# Patient Record
Sex: FEMALE | Race: WHITE
[De-identification: names, ages, dates, MRNs, and addresses within clinical notes are randomized per-mention and may not be internally consistent; named-entity substitution may affect disease eponyms.]

---

## 2018-03-30 ENCOUNTER — HOSPITAL ENCOUNTER (OUTPATIENT)
Dept: HOSPITAL 47 - RADCTMAIN | Age: 38
Discharge: HOME | End: 2018-03-30
Payer: COMMERCIAL

## 2018-03-30 DIAGNOSIS — N20.0: Primary | ICD-10-CM

## 2018-03-30 DIAGNOSIS — M54.2: ICD-10-CM

## 2018-03-30 DIAGNOSIS — R10.2: ICD-10-CM

## 2018-03-30 PROCEDURE — 72050 X-RAY EXAM NECK SPINE 4/5VWS: CPT

## 2018-03-30 PROCEDURE — 74176 CT ABD & PELVIS W/O CONTRAST: CPT

## 2018-03-30 NOTE — CT
EXAMINATION TYPE: CT abdomen pelvis wo con

 

DATE OF EXAM: 3/30/2018

 

COMPARISON: 3/6/2011

 

HISTORY: c/o low back and pelvic pain X 2 months.

 

CT DLP: 1297.0 mGycm

Automated exposure control for dose reduction was used.

 

TECHNIQUE:  Helical acquisition of images was performed from the lung bases through the pelvis.

 

FINDINGS: 

 

The lung bases are clear. There is no pleural effusion. Heart size is normal.

 

Liver spleen pancreas gallbladder appear normal. Bile ducts are not dilated.

 

There is no adrenal mass. Kidneys have normal size and contour. There is no hydronephrosis. There is 
a 3 mm calcification in the lower pole right kidney. There is no hydronephrosis. There is no retroper
itoneal adenopathy. There is no ascites. Bladder distends smoothly. The uterus is anteverted. I see n
o bony destructive process. There is no evidence of a pelvic mass. I see no intestinal wall thickenin
g. There are no dilated loops. Appendix appears normal. Fecal pattern is normal.

IMPRESSION: 

NONOBSTRUCTING SMALL RIGHT RENAL CALCULUS. NO SIGN OF ACUTE ABDOMEN AND PELVIS. CALCIFICATION APPEARS
 NEW COMPARED TO OLD EXAM.

## 2018-03-30 NOTE — XR
EXAMINATION TYPE: XR cervical spine comp

 

DATE OF EXAM: 3/30/2018

 

COMPARISON: NONE

 

HISTORY: Neck pain

 

TECHNIQUE: 7 views

 

FINDINGS: Cervical vertebra have fairly normal spacing and alignment. Posterior elements are intact. 
Atlantoaxial facet joint is normal. There are no cervical ribs.

 

IMPRESSION: Negative cervical spine exam.

## 2018-04-04 ENCOUNTER — HOSPITAL ENCOUNTER (OUTPATIENT)
Dept: HOSPITAL 47 - RADXRMAIN | Age: 38
Discharge: HOME | End: 2018-04-04
Payer: COMMERCIAL

## 2018-04-04 DIAGNOSIS — M46.96: ICD-10-CM

## 2018-04-04 DIAGNOSIS — M47.814: ICD-10-CM

## 2018-04-04 DIAGNOSIS — M48.061: Primary | ICD-10-CM

## 2018-04-04 PROCEDURE — 72070 X-RAY EXAM THORAC SPINE 2VWS: CPT

## 2018-04-04 PROCEDURE — 72110 X-RAY EXAM L-2 SPINE 4/>VWS: CPT

## 2018-04-04 NOTE — XR
EXAMINATION TYPE: 

 

XR thoracic spine 3 views,

XR lumbosacral spine min 5 views

 

DATE OF EXAM: 4/4/2018

 

COMPARISON: NONE

 

HISTORY: 37-year-old female thoracic and lumbar pain, lumbago

 

FINDINGS: 

 

Thoracic spine:

12 rib-bearing thoracic vertebral bodies. All pedicles are visualized. Very minimal leftward curvatur
e along the upper thoracic spine. Mild endplate spondylosis mid to lower thoracic spine. Vertebral talita
dy heights are preserved and alignment is maintained.

 

Lumbar spine:

Slight leftward truncal shift 5 lumbar type vertebral bodies. Chronic ununited secondary ossification
 center of the right L1 transverse process. No pars interarticularis defect. Mild facet arthropathy l
ower lumbar spine. There is mild disc interspace narrowing in the upper lumbar spine. Vertebral body 
heights are preserved and alignment is maintained.

 

 

 

 

IMPRESSION: 

1. Thoracic spine: Mild endplate spondylosis mid to lower thoracic spine. No vertebral compression co
llapse or malalignment.

2. Lumbar spine: Facet arthropathy lower lumbar spine. Mild degenerative disc interspace narrowing up
per lumbar spine. No vertebral compression collapse or malalignment.

3. Leftward truncal shift and slight leftward curvature along the upper thoracic spine could be posit
ional, due to muscle spasm, or secondary to a very subtle scoliosis.

## 2018-04-21 ENCOUNTER — HOSPITAL ENCOUNTER (OUTPATIENT)
Dept: HOSPITAL 47 - RADMRIMAIN | Age: 38
Discharge: HOME | End: 2018-04-21
Payer: COMMERCIAL

## 2018-04-21 DIAGNOSIS — M46.96: Primary | ICD-10-CM

## 2018-04-21 PROCEDURE — 72148 MRI LUMBAR SPINE W/O DYE: CPT

## 2018-04-21 NOTE — MR
EXAMINATION TYPE: MR lumbar spine wo con

 

DATE OF EXAM: 4/21/2018

 

COMPARISON: Lumbar spine x-ray April 4, 2018. CT abdomen and pelvis March 30, 2018.

 

HISTORY: Low back pain per order. Pain for 3 to 4 months per patient.

 

TECHNIQUE: Multiplanar, multisequence imaging of the lumbar spine is performed without IV contrast.

 

FINDINGS: Motion artifact is noted making evaluation slightly suboptimal. Sagittal images of the lumb
ar spine show vertebral body heights and alignment to appear satisfactory. There is disc desiccation 
L3-L4 level otherwise the intervertebral discs demonstrate normal heights and hydration. No suspiciou
s posterior disc herniations are seen on sagittal images.  The conus medullaris is normal in position
 and signal ending at inferior L1 level.  The bone marrow signal intensity is within normal limits. N
o significant spurring is seen.

 

Axial images show mild multilevel facet degenerative changes in the mid to lower lumbar spine most pr
ominent L5-S1 level but the spinal canal is preserved and the bilateral neural foramina are patent at
 all lumbar levels. No suspicious retroperitoneal findings are seen.

 

IMPRESSION: Multilevel facet arthropathy most prominent mid to lower lumbar levels.

## 2018-08-20 ENCOUNTER — HOSPITAL ENCOUNTER (EMERGENCY)
Dept: HOSPITAL 47 - EC | Age: 38
Discharge: HOME | End: 2018-08-20
Payer: COMMERCIAL

## 2018-08-20 VITALS — RESPIRATION RATE: 18 BRPM

## 2018-08-20 DIAGNOSIS — Z79.899: ICD-10-CM

## 2018-08-20 DIAGNOSIS — Z91.018: ICD-10-CM

## 2018-08-20 DIAGNOSIS — Z88.2: ICD-10-CM

## 2018-08-20 DIAGNOSIS — F32.9: ICD-10-CM

## 2018-08-20 DIAGNOSIS — H60.91: Primary | ICD-10-CM

## 2018-08-20 DIAGNOSIS — F41.9: ICD-10-CM

## 2018-08-20 DIAGNOSIS — R68.84: ICD-10-CM

## 2018-08-20 PROCEDURE — 99282 EMERGENCY DEPT VISIT SF MDM: CPT
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
## 2018-08-21 VITALS — DIASTOLIC BLOOD PRESSURE: 77 MMHG | TEMPERATURE: 98 F | HEART RATE: 96 BPM | SYSTOLIC BLOOD PRESSURE: 128 MMHG

## 2018-10-02 ENCOUNTER — HOSPITAL ENCOUNTER (EMERGENCY)
Dept: HOSPITAL 47 - EC | Age: 38
Discharge: HOME | End: 2018-10-02
Payer: COMMERCIAL

## 2018-10-02 VITALS
DIASTOLIC BLOOD PRESSURE: 87 MMHG | TEMPERATURE: 99 F | HEART RATE: 111 BPM | RESPIRATION RATE: 20 BRPM | SYSTOLIC BLOOD PRESSURE: 123 MMHG

## 2018-10-02 DIAGNOSIS — J40: Primary | ICD-10-CM

## 2018-10-02 DIAGNOSIS — Z87.01: ICD-10-CM

## 2018-10-02 DIAGNOSIS — Z91.018: ICD-10-CM

## 2018-10-02 DIAGNOSIS — G47.30: ICD-10-CM

## 2018-10-02 DIAGNOSIS — Z88.2: ICD-10-CM

## 2018-10-02 PROCEDURE — 71046 X-RAY EXAM CHEST 2 VIEWS: CPT

## 2018-10-02 PROCEDURE — 99283 EMERGENCY DEPT VISIT LOW MDM: CPT

## 2018-10-02 PROCEDURE — 94640 AIRWAY INHALATION TREATMENT: CPT

## 2018-10-02 NOTE — XR
EXAMINATION TYPE: XR chest 2V

 

DATE OF EXAM: 10/2/2018

 

COMPARISON: 10/28/2017

 

HISTORY: Cough and congestion

 

TECHNIQUE:  Frontal and lateral views of the chest are obtained.

 

FINDINGS:  Heart and mediastinum are normal. There is some linear density in the lingula consistent w
ith subsegmental atelectasis. Costophrenic angles are clear. There are no hilar masses. Bony thorax i
s intact.

 

IMPRESSION:  New subsegmental atelectasis compared to old exam. Normal heart.

## 2018-10-02 NOTE — ED
URI HPI





- General


Chief Complaint: Upper Respiratory Infection


Stated Complaint: congestion


Time Seen by Provider: 10/02/18 20:51


Source: patient


Mode of arrival: ambulatory


Limitations: no limitations





- History of Present Illness


MD Complaint: cough, sore throat, nasal congestion


-: days(s)


Severity: moderate


Quality: burning


Consistency: constant


Improves With: nothing


Worsens With: other


Associated Symptoms: nasal congestion, sore throat, cough


Treatments Prior to Arrival: none





- Related Data


 Home Medications











 Medication  Instructions  Recorded  Confirmed


 


D-Methorphan/PE/Acetaminophen 1 cap PO Q6HR PRN 10/02/18 10/02/18





[Vicks Dayquil Liquicaps]   








 Previous Rx's











 Medication  Instructions  Recorded


 


Albuterol Inhaler [Ventolin Hfa 1 - 2 puff INHALATION Q6HR PRN #1 10/02/18





Inhaler] inhaler 


 


Azithromycin [Zithromax Z-pack] 250 mg PO AS DIRECTED #6 tab 10/02/18


 


Benzonatate [Tessalon Perles] 100 mg PO TID PRN #24 capsule 10/02/18


 


predniSONE 20 mg PO BID #8 tab 10/02/18











 Allergies











Allergy/AdvReac Type Severity Reaction Status Date / Time


 


Sulfa (Sulfonamide Allergy  Rash/Hives Verified 10/02/18 20:41





Antibiotics)     


 


BANANAS Allergy  THROAT Uncoded 10/02/18 20:31





   SWELLS  


 


COCONUT Allergy  THROAT Uncoded 10/02/18 20:31





   SWELLS  














Review of Systems


ROS Statement: 


Those systems with pertinent positive or pertinent negative responses have been 

documented in the HPI.





ROS Other: All systems not noted in ROS Statement are negative.


Constitutional: Reports: fever.  Denies: chills


ENT: Reports: throat pain, congestion


Respiratory: Reports: cough.  Denies: dyspnea, hemoptysis


Cardiovascular: Reports: chest pain (Bilateral rib pain during coughing).  

Denies: palpitations, edema, syncope


Gastrointestinal: Denies: abdominal pain, vomiting, diarrhea


Musculoskeletal: Denies: back pain


Skin: Denies: rash


Neurological: Denies: headache, weakness, numbness, paresthesias





Past Medical History


Past Medical History: Pneumonia, Sleep Apnea/CPAP/BIPAP


Additional Past Medical History / Comment(s): back pain, numbness & tingling, 

not using c-pap


History of Any Multi-Drug Resistant Organisms: None Reported


Past Surgical History: Orthopedic Surgery, Tubal Ligation


Additional Past Surgical History / Comment(s): carpul tunnel surgery


Past Anesthesia/Blood Transfusion Reactions: No Reported Reaction


Past Psychological History: Anxiety, Depression


Smoking Status: Never smoker


Past Alcohol Use History: Occasional


Past Drug Use History: None Reported





General Exam


Limitations: no limitations


General appearance: alert, in no apparent distress, obese


Head exam: Present: atraumatic, normocephalic


Eye exam: Present: normal appearance.  Absent: scleral icterus, conjunctival 

injection


ENT exam: Present: normal oropharynx (Trace inflammation of the oropharynx.  

The uvula is midline with no edema.)


Neck exam: Present: normal inspection, full ROM, lymphadenopathy.  Absent: 

meningismus


Respiratory exam: Present: wheezes.  Absent: respiratory distress, rales, 

rhonchi, stridor


Cardiovascular Exam: Present: regular rate, normal rhythm, normal heart sounds.

  Absent: systolic murmur, diastolic murmur, rubs, gallop


GI/Abdominal exam: Present: soft.  Absent: tenderness, guarding, rebound


Extremities exam: Present: normal inspection


Back exam: Present: normal inspection.  Absent: CVA tenderness (R), CVA 

tenderness (L)


Neurological exam: Present: alert


Skin exam: Present: warm, dry, intact, normal color.  Absent: rash





Course


 Vital Signs











  10/02/18 10/02/18 10/02/18





  20:30 21:44 21:51


 


Temperature 98.6 F  


 


Pulse Rate 115 H 110 H 110 H


 


Respiratory 18  





Rate   


 


Blood Pressure 156/87  


 


O2 Sat by Pulse 95  





Oximetry   














Disposition


Clinical Impression: 


 Bronchitis





Disposition: HOME SELF-CARE


Condition: Good


Instructions:  Acute Bronchitis (ED)


Prescriptions: 


Albuterol Inhaler [Ventolin Hfa Inhaler] 1 - 2 puff INHALATION Q6HR PRN #1 

inhaler


 PRN Reason: Wheezing


Azithromycin [Zithromax Z-pack] 250 mg PO AS DIRECTED #6 tab


Benzonatate [Tessalon Perles] 100 mg PO TID PRN #24 capsule


 PRN Reason: Cough


predniSONE 20 mg PO BID #8 tab


Is patient prescribed a controlled substance at d/c from ED?: No


Referrals: 


Abiel Gruber MD [Primary Care Provider] - 1-2 days

## 2018-10-15 ENCOUNTER — HOSPITAL ENCOUNTER (OUTPATIENT)
Dept: HOSPITAL 47 - LABWHC1 | Age: 38
End: 2018-10-15
Attending: NURSE PRACTITIONER
Payer: COMMERCIAL

## 2018-10-15 DIAGNOSIS — D64.9: ICD-10-CM

## 2018-10-15 DIAGNOSIS — E55.9: Primary | ICD-10-CM

## 2018-10-15 DIAGNOSIS — R53.83: ICD-10-CM

## 2018-10-15 DIAGNOSIS — M62.838: ICD-10-CM

## 2018-10-15 LAB
ALBUMIN SERPL-MCNC: 3.9 G/DL (ref 3.5–5)
ALP SERPL-CCNC: 91 U/L (ref 38–126)
ALT SERPL-CCNC: 32 U/L (ref 9–52)
ANION GAP SERPL CALC-SCNC: 11 MMOL/L
AST SERPL-CCNC: 23 U/L (ref 14–36)
BASOPHILS # BLD AUTO: 0.1 K/UL (ref 0–0.2)
BASOPHILS NFR BLD AUTO: 1 %
BUN SERPL-SCNC: 12 MG/DL (ref 7–17)
CALCIUM SPEC-MCNC: 9.3 MG/DL (ref 8.4–10.2)
CHLORIDE SERPL-SCNC: 105 MMOL/L (ref 98–107)
CO2 SERPL-SCNC: 23 MMOL/L (ref 22–30)
EOSINOPHIL # BLD AUTO: 0.5 K/UL (ref 0–0.7)
EOSINOPHIL NFR BLD AUTO: 5 %
ERYTHROCYTE [DISTWIDTH] IN BLOOD BY AUTOMATED COUNT: 4.82 M/UL (ref 3.8–5.4)
ERYTHROCYTE [DISTWIDTH] IN BLOOD: 13.1 % (ref 11.5–15.5)
GLUCOSE SERPL-MCNC: 218 MG/DL (ref 74–99)
HCT VFR BLD AUTO: 41.8 % (ref 34–46)
HGB BLD-MCNC: 13.7 GM/DL (ref 11.4–16)
LYMPHOCYTES # SPEC AUTO: 2.7 K/UL (ref 1–4.8)
LYMPHOCYTES NFR SPEC AUTO: 29 %
MCH RBC QN AUTO: 28.4 PG (ref 25–35)
MCHC RBC AUTO-ENTMCNC: 32.8 G/DL (ref 31–37)
MCV RBC AUTO: 86.7 FL (ref 80–100)
MONOCYTES # BLD AUTO: 0.4 K/UL (ref 0–1)
MONOCYTES NFR BLD AUTO: 4 %
NEUTROPHILS # BLD AUTO: 5.7 K/UL (ref 1.3–7.7)
NEUTROPHILS NFR BLD AUTO: 60 %
PLATELET # BLD AUTO: 204 K/UL (ref 150–450)
POTASSIUM SERPL-SCNC: 4.5 MMOL/L (ref 3.5–5.1)
PROT SERPL-MCNC: 7 G/DL (ref 6.3–8.2)
SODIUM SERPL-SCNC: 139 MMOL/L (ref 137–145)
T4 FREE SERPL-MCNC: 0.92 NG/DL (ref 0.78–2.19)
VIT B12 SERPL-MCNC: 558 PG/ML (ref 200–944)
WBC # BLD AUTO: 9.4 K/UL (ref 3.8–10.6)

## 2018-10-15 PROCEDURE — 82607 VITAMIN B-12: CPT

## 2018-10-15 PROCEDURE — 86038 ANTINUCLEAR ANTIBODIES: CPT

## 2018-10-15 PROCEDURE — 85025 COMPLETE CBC W/AUTO DIFF WBC: CPT

## 2018-10-15 PROCEDURE — 84439 ASSAY OF FREE THYROXINE: CPT

## 2018-10-15 PROCEDURE — 84443 ASSAY THYROID STIM HORMONE: CPT

## 2018-10-15 PROCEDURE — 80053 COMPREHEN METABOLIC PANEL: CPT

## 2018-10-15 PROCEDURE — 83540 ASSAY OF IRON: CPT

## 2018-10-15 PROCEDURE — 84481 FREE ASSAY (FT-3): CPT

## 2018-10-15 PROCEDURE — 36415 COLL VENOUS BLD VENIPUNCTURE: CPT

## 2018-10-15 PROCEDURE — 82306 VITAMIN D 25 HYDROXY: CPT

## 2018-10-15 PROCEDURE — 83550 IRON BINDING TEST: CPT

## 2018-10-15 PROCEDURE — 84207 ASSAY OF VITAMIN B-6: CPT

## 2019-06-13 ENCOUNTER — HOSPITAL ENCOUNTER (EMERGENCY)
Dept: HOSPITAL 47 - EC | Age: 39
Discharge: HOME | End: 2019-06-13
Payer: COMMERCIAL

## 2019-06-13 VITALS — RESPIRATION RATE: 16 BRPM | HEART RATE: 70 BPM | DIASTOLIC BLOOD PRESSURE: 98 MMHG | SYSTOLIC BLOOD PRESSURE: 148 MMHG

## 2019-06-13 VITALS — TEMPERATURE: 98.4 F

## 2019-06-13 DIAGNOSIS — M25.461: Primary | ICD-10-CM

## 2019-06-13 DIAGNOSIS — Z88.2: ICD-10-CM

## 2019-06-13 DIAGNOSIS — Z91.018: ICD-10-CM

## 2019-06-13 PROCEDURE — 99284 EMERGENCY DEPT VISIT MOD MDM: CPT

## 2019-06-13 NOTE — XR
EXAMINATION TYPE: XR knee complete RT

 

DATE OF EXAM: 6/13/2019

 

COMPARISON: NONE

 

HISTORY: Knee pain

 

TECHNIQUE: 3 views

 

FINDINGS: There is small knee joint effusion. I see no fracture nor dislocation. Joint spaces are pascual
rly normal.

 

IMPRESSION: Small joint effusion. No fracture.

## 2019-06-13 NOTE — US
EXAMINATION TYPE: US venous doppler duplex LE RT

 

DATE OF EXAM: 6/13/2019 7:57 PM

 

COMPARISON: NONE

 

CLINICAL HISTORY: Pain. Right leg pain

 

SIDE PERFORMED: Right  

 

TECHNIQUE:  The lower extremity deep venous system is examined utilizing real time linear array sonog
aston with graded compression, doppler sonography and color-flow sonography.

 

VESSELS IMAGED:

External Iliac Vein (EIV)

Common Femoral Vein

Deep Femoral Vein

Greater Saphenous Vein *

Femoral Vein

Popliteal Vein

Small Saphenous Vein *

Proximal Calf Veins

(* superficial vessels)

 

 

 

Right Leg:  Negative for DVT

 

No evidence of DVT right leg.

 

IMPRESSION: No evidence of deep venous thrombosis in the right leg.

## 2019-06-13 NOTE — ED
General Adult HPI





- General


Chief complaint: Extremity Problem,Nontraumatic


Stated complaint: Knee pain


Time Seen by Provider: 06/13/19 18:58


Source: patient, RN notes reviewed


Mode of arrival: ambulatory


Limitations: no limitations





- History of Present Illness


Initial comments: 





38-year-old female presents to the emergency department for a chief complaint of

right knee pain.  States she has had right knee pain for the past 3 days.  

States the pain shoots up to her hip and down to her foot.  Denies any calf 

pain.  Denies any injuries.  No fevers or chills.  States it does hurt to bend 

it.  States she is able to ambulate but it is somewhat painful.  States she has 

a history of back pain and neuropathy but has not had any issues.  Patient did 

try Aleve last night which only helped minimally.Patient has no other complaints

at this time including shortness of breath, chest pain, abdominal pain, nausea 

or vomiting, headache, or visual changes.





- Related Data


                                Home Medications











 Medication  Instructions  Recorded  Confirmed


 


D-Methorphan/PE/Acetaminophen 1 cap PO Q6HR PRN 10/02/18 10/02/18





[Vicks Dayquil Liquicaps]   








                                  Previous Rx's











 Medication  Instructions  Recorded


 


Albuterol Inhaler [Ventolin Hfa 1 - 2 puff INHALATION Q6HR PRN #1 10/02/18





Inhaler] inhaler 


 


Azithromycin [Zithromax Z-pack] 250 mg PO AS DIRECTED #6 tab 10/02/18


 


Benzonatate [Tessalon Perles] 100 mg PO TID PRN #24 capsule 10/02/18


 


predniSONE 20 mg PO BID #8 tab 10/02/18











                                    Allergies











Allergy/AdvReac Type Severity Reaction Status Date / Time


 


Sulfa (Sulfonamide Allergy  Rash/Hives Verified 06/13/19 18:20





Antibiotics)     


 


BANANAS Allergy  THROAT Uncoded 06/13/19 18:20





   SWELLS  


 


COCONUT Allergy  THROAT Uncoded 06/13/19 18:20





   MÓNICA  














Review of Systems


ROS Statement: 


Those systems with pertinent positive or pertinent negative responses have been 

documented in the HPI.





ROS Other: All systems not noted in ROS Statement are negative.





Past Medical History


Past Medical History: Pneumonia, Sleep Apnea/CPAP/BIPAP


Additional Past Medical History / Comment(s): back pain, numbness & tingling, 

not using c-pap


History of Any Multi-Drug Resistant Organisms: None Reported


Past Surgical History: Orthopedic Surgery, Tubal Ligation


Additional Past Surgical History / Comment(s): carpul tunnel surgery


Past Anesthesia/Blood Transfusion Reactions: No Reported Reaction


Past Psychological History: Anxiety, Depression


Smoking Status: Never smoker


Past Alcohol Use History: Occasional


Past Drug Use History: None Reported





General Exam


Limitations: no limitations


General appearance: alert, in no apparent distress


Head exam: Present: atraumatic, normocephalic, normal inspection


Eye exam: Present: normal appearance, PERRL, EOMI.  Absent: scleral icterus, 

conjunctival injection, periorbital swelling


ENT exam: Present: normal exam, mucous membranes moist


Neck exam: Present: normal inspection, full ROM.  Absent: tenderness, 

meningismus


Respiratory exam: Present: normal lung sounds bilaterally.  Absent: respiratory 

distress, wheezes, rales, rhonchi, stridor


Cardiovascular Exam: Present: regular rate, normal rhythm, normal heart sounds. 

Absent: systolic murmur, diastolic murmur, rubs, gallop, clicks


Extremities exam: Present: tenderness (Minimal anterior knee tenderness, no 

posterior knee tenderness or calf tenderness.), normal capillary refill 

(Capillary refill less than 2 seconds, DP pulse 2+ in the right lower extremity 

and equal bilaterally.), other (Sensation intact in the right lower extremity.).

 Absent: full ROM (Patient has about 90 flexion, full extension.), pedal edema,

joint swelling (No significant or appreciable edema or erythema noted of the 

right knee, no evidence of infection.), calf tenderness (Negative Homans sign.)





Course


                                   Vital Signs











  06/13/19





  18:18


 


Temperature 98.4 F


 


Pulse Rate 94


 


Respiratory 18





Rate 


 


Blood Pressure 128/82


 


O2 Sat by Pulse 95





Oximetry 














Medical Decision Making





- Medical Decision Making





38-year-old obese female presents for right knee pain 3 days.  Denies injury.  

Patient is able to flex knee to 90 her no erythema or edema.  No evidence for 

infection.  Patient is ambulatory on the knee without difficulty.  Neurovascular

status is intact.Ultrasound of the right leg shows no evidence of DVT.  X-ray 

shows a small joint effusion, no fracture.  At this time I do not see any 

emergent causes of knee pain.  Could be arthritic in nature.  Patient given Ace 

wrap.  Recommended follow-up with orthopedics.  Recommended returning here if 

she has any worsening symptoms such as increased pain, fever, or redness of the 

knee.





Disposition


Clinical Impression: 


 Knee pain, right, Joint effusion





Disposition: HOME SELF-CARE


Condition: Good


Instructions (If sedation given, give patient instructions):  Knee Pain (ED)


Additional Instructions: 


Please take Motrin and Tylenol for pain.  Please use Ace wrap while awake.  

Please follow-up with orthopedics in one to 2 days.  Return if you have any 

worsening symptoms or develop any fevers.


Is patient prescribed a controlled substance at d/c from ED?: No


Referrals: 


Abiel Gruber MD [Primary Care Provider] - 1-2 days


Jose Taylor DO [Medical Doctor] - 1-2 days


Time of Disposition: 20:59

## 2019-10-07 ENCOUNTER — HOSPITAL ENCOUNTER (EMERGENCY)
Dept: HOSPITAL 47 - EC | Age: 39
Discharge: HOME | End: 2019-10-07
Payer: COMMERCIAL

## 2019-10-07 VITALS — TEMPERATURE: 98.1 F

## 2019-10-07 VITALS — DIASTOLIC BLOOD PRESSURE: 90 MMHG | SYSTOLIC BLOOD PRESSURE: 154 MMHG | HEART RATE: 80 BPM | RESPIRATION RATE: 18 BRPM
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
## 2019-10-07 DIAGNOSIS — Z88.2: ICD-10-CM
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
## 2019-10-07 DIAGNOSIS — Z91.018: ICD-10-CM

## 2019-10-07 DIAGNOSIS — S01.21XA: Primary | ICD-10-CM

## 2019-10-07 DIAGNOSIS — Z23: ICD-10-CM

## 2019-10-07 DIAGNOSIS — Z53.20: ICD-10-CM

## 2019-10-07 PROCEDURE — 90471 IMMUNIZATION ADMIN: CPT

## 2019-10-07 PROCEDURE — 99284 EMERGENCY DEPT VISIT MOD MDM: CPT

## 2019-10-07 PROCEDURE — 70160 X-RAY EXAM OF NASAL BONES: CPT

## 2019-10-07 PROCEDURE — 90715 TDAP VACCINE 7 YRS/> IM: CPT
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
## 2019-10-16 ENCOUNTER — HOSPITAL ENCOUNTER (OUTPATIENT)
Dept: HOSPITAL 47 - LABWHC1 | Age: 39
Discharge: HOME | End: 2019-10-16
Payer: COMMERCIAL

## 2019-10-16 ENCOUNTER — HOSPITAL ENCOUNTER (OUTPATIENT)
Dept: HOSPITAL 47 - BARWHC3 | Age: 39
End: 2019-10-16
Payer: COMMERCIAL

## 2019-10-16 VITALS — HEART RATE: 101 BPM | SYSTOLIC BLOOD PRESSURE: 119 MMHG | DIASTOLIC BLOOD PRESSURE: 79 MMHG | TEMPERATURE: 98.5 F

## 2019-10-16 VITALS — BODY MASS INDEX: 45.6 KG/M2

## 2019-10-16 DIAGNOSIS — K90.9: ICD-10-CM

## 2019-10-16 DIAGNOSIS — E21.1: Primary | ICD-10-CM

## 2019-10-16 DIAGNOSIS — M79.673: ICD-10-CM

## 2019-10-16 DIAGNOSIS — M25.559: ICD-10-CM

## 2019-10-16 DIAGNOSIS — M25.569: ICD-10-CM

## 2019-10-16 DIAGNOSIS — N19: ICD-10-CM

## 2019-10-16 DIAGNOSIS — M54.5: ICD-10-CM

## 2019-10-16 DIAGNOSIS — K21.9: Primary | ICD-10-CM

## 2019-10-16 DIAGNOSIS — K74.1: ICD-10-CM

## 2019-10-16 DIAGNOSIS — D50.9: ICD-10-CM

## 2019-10-16 DIAGNOSIS — E55.9: ICD-10-CM

## 2019-10-16 DIAGNOSIS — K50.90: ICD-10-CM

## 2019-10-16 LAB
APTT BLD: 24.6 SEC (ref 22–30)
ERYTHROCYTE [DISTWIDTH] IN BLOOD BY AUTOMATED COUNT: 5.07 M/UL (ref 3.8–5.4)
ERYTHROCYTE [DISTWIDTH] IN BLOOD: 12.9 % (ref 11.5–15.5)
HCT VFR BLD AUTO: 44.7 % (ref 34–46)
HGB BLD-MCNC: 15.3 GM/DL (ref 11.4–16)
INR PPP: 0.9 (ref ?–1.2)
MCH RBC QN AUTO: 30.1 PG (ref 25–35)
MCHC RBC AUTO-ENTMCNC: 34.2 G/DL (ref 31–37)
MCV RBC AUTO: 88.2 FL (ref 80–100)
PLATELET # BLD AUTO: 291 K/UL (ref 150–450)
PT BLD: 9.8 SEC (ref 9–12)
WBC # BLD AUTO: 10.2 K/UL (ref 3.8–10.6)

## 2019-10-16 PROCEDURE — 85027 COMPLETE CBC AUTOMATED: CPT

## 2019-10-16 PROCEDURE — 99211 OFF/OP EST MAY X REQ PHY/QHP: CPT

## 2019-10-16 PROCEDURE — 84630 ASSAY OF ZINC: CPT

## 2019-10-16 PROCEDURE — 83735 ASSAY OF MAGNESIUM: CPT

## 2019-10-16 PROCEDURE — 82746 ASSAY OF FOLIC ACID SERUM: CPT

## 2019-10-16 PROCEDURE — 84425 ASSAY OF VITAMIN B-1: CPT

## 2019-10-16 PROCEDURE — 80053 COMPREHEN METABOLIC PANEL: CPT

## 2019-10-16 PROCEDURE — 83036 HEMOGLOBIN GLYCOSYLATED A1C: CPT

## 2019-10-16 PROCEDURE — 84255 ASSAY OF SELENIUM: CPT

## 2019-10-16 PROCEDURE — 84590 ASSAY OF VITAMIN A: CPT

## 2019-10-16 PROCEDURE — 82525 ASSAY OF COPPER: CPT

## 2019-10-16 PROCEDURE — 82728 ASSAY OF FERRITIN: CPT

## 2019-10-16 PROCEDURE — 83550 IRON BINDING TEST: CPT

## 2019-10-16 PROCEDURE — 84100 ASSAY OF PHOSPHORUS: CPT

## 2019-10-16 PROCEDURE — 82607 VITAMIN B-12: CPT

## 2019-10-16 PROCEDURE — 85610 PROTHROMBIN TIME: CPT

## 2019-10-16 PROCEDURE — 84134 ASSAY OF PREALBUMIN: CPT

## 2019-10-16 PROCEDURE — 83970 ASSAY OF PARATHORMONE: CPT

## 2019-10-16 PROCEDURE — 36415 COLL VENOUS BLD VENIPUNCTURE: CPT

## 2019-10-16 PROCEDURE — 85730 THROMBOPLASTIN TIME PARTIAL: CPT

## 2019-10-16 PROCEDURE — 80061 LIPID PANEL: CPT

## 2019-10-16 PROCEDURE — 83540 ASSAY OF IRON: CPT

## 2019-10-16 PROCEDURE — 84443 ASSAY THYROID STIM HORMONE: CPT

## 2019-10-16 PROCEDURE — 93005 ELECTROCARDIOGRAM TRACING: CPT

## 2019-10-16 PROCEDURE — 82306 VITAMIN D 25 HYDROXY: CPT

## 2019-10-16 NOTE — P.HPBAR
Bariatric H&P





- History & Physicial


H&P Date: 10/16/19


History & Physicial: 


Visit/CC: bariatric consultation





Patient initial contact: 





Initial weight: 122.334 kg


Initial weight in pounds: 269.70


Height: 5 ft 4.5 in


Initial BMI: 45.6





Last weight: 





Current weight: 122.334 kg


Current weight in pounds: 269.70


Current BMI: 45.6





Ideal body weight (based on NIH guidelines): 55.565 kg





Excess body weight loss: 0.0%








The patient is a 38 year-old F who presents for Bariatric Assessment.








Gastric bypass options. Highest weight is 310 pounds. She tried adipex, weight 

watches, going to gym, card restriction. Adipex weight loss of 40 pounds with 

weight regain. Aunts have trouble with weight. No family members with weight 

loss. She has friends with gastric bypass 300 to 130 pounds for 6 years. No 

Crohns disease or ulcerative colitis. No DVTs of PE. No MS or lupus. No stomach 

or esophageal cancer. Has GERD. She has gallbladder. No family history of 

gallbladder problems. She has lower back pain and sees back specialist. She has 

hip pain, knee left pain. No ankle pain. She has feet pain. She has new right 

new pain. She snores. She had sleep test that was inadequate and uses CPAP. 

Diabetes in grandfather including parents. 





Had tubes 





[facility] [place]





Past Medical History


Past Medical History: Pneumonia, Sleep Apnea/CPAP/BIPAP


Additional Past Medical History / Comment(s): back pain, numbness & tingling 

(bilateral legs with cold temperatures), not using c-pap though she was dx with 

sleep apnea 2014-15(??), prediabetes,


History of Any Multi-Drug Resistant Organisms: None Reported


Past Surgical History: Orthopedic Surgery, Tubal Ligation


Additional Past Surgical History / Comment(s): bilateral carpul tunnel surgery, 

left knee arthroscopic + repair of torn ligaments, 2019: Nerve ablation 

lumbar spine (Dr. Dupree; in office procedure)


Past Anesthesia/Blood Transfusion Reactions: No Reported Reaction


Additional Past Anesthesia/Blood Transfusion Reaction / Comm: No blood 

transfusion to date


Past Psychological History: Anxiety, Depression


Additional Psychological History / Comment(s): 10/16/19: Takes Abilify 5mg 

daily, Cymbalta 60 mg BID, and Trazadone 100 mg at HS


Smoking Status: Never smoker


Past Alcohol Use History: Occasional


Additional Past Alcohol Use History / Comment(s): "maybe once/month may have 2 

beers"


Past Drug Use History: None Reported


Additional Drug Use History / Comment(s): 2nd hand smoke exposure when visiting 

parents





- Past Family History


  ** Mother


Family Medical History: CVA/TIA, Hyperlipidemia, Hypertension





  ** Father


Family Medical History: Hyperlipidemia, Hypertension


Additional Family Medical History / Comment(s):  at age 60 from 

alcoholism (organ failure)





  ** Brother(s)


Family Medical History: No Reported History





Surgical - Exam


                                   Vital Signs











Temp Pulse BP


 


 98.5 F   101 H  119/79 


 


 10/16/19 16:02  10/16/19 16:02  10/16/19 16:02














Bariatric Checklist


Checklist: 


Plan: 





Checklist: 





EGD: 


1. Hiatal hernia: 


2. H. Pylori: 





HgbA1c: 





Vitamin D: 





Smoking: Never smoker





Primary care physician referral: Hiwot Aleman NP ([facility]: 

erika Pulido MD)





Psychiatry clearance: 





Cardiology clearance: 





Sleep study: 





Diet journal: 





VTE risk score: 





VTE risk level: 





Rehab needs at discharge:

## 2019-10-17 LAB
ALBUMIN SERPL-MCNC: 4.8 G/DL (ref 3.8–4.9)
ALBUMIN/GLOB SERPL: 1.92 G/DL (ref 1.6–3.17)
ALP SERPL-CCNC: 58 U/L (ref 41–126)
ALT SERPL-CCNC: 30 U/L (ref 8–44)
ANION GAP SERPL CALC-SCNC: 9.7 MMOL/L (ref 4–12)
AST SERPL-CCNC: 22 U/L (ref 13–35)
BUN SERPL-SCNC: 13 MG/DL (ref 9–27)
BUN/CREAT SERPL: 16.25 RATIO (ref 12–20)
CALCIUM SPEC-MCNC: 10.1 MG/DL (ref 8.7–10.3)
CHLORIDE SERPL-SCNC: 103 MMOL/L (ref 96–109)
CHOLEST SERPL-MCNC: 265 MG/DL (ref 0–200)
CO2 SERPL-SCNC: 25.3 MMOL/L (ref 21.6–31.8)
FERRITIN SERPL-MCNC: 77.8 NG/ML (ref 10–291)
GLOBULIN SER CALC-MCNC: 2.5 G/DL (ref 1.6–3.3)
GLUCOSE SERPL-MCNC: 143 MG/DL (ref 70–110)
HBA1C MFR BLD: 6.6 % (ref 4–6)
HDLC SERPL-MCNC: 69 MG/DL (ref 40–60)
LDLC SERPL CALC-MCNC: 166.6 MG/DL (ref 0–131)
MAGNESIUM SPEC-SCNC: 1.9 MG/DL (ref 1.5–2.4)
POTASSIUM SERPL-SCNC: 4.2 MMOL/L (ref 3.5–5.5)
PROT SERPL-MCNC: 7.3 G/DL (ref 6.2–8.2)
SODIUM SERPL-SCNC: 138 MMOL/L (ref 135–145)
TRIGL SERPL-MCNC: 147 MG/DL (ref 0–149)
VLDLC SERPL CALC-MCNC: 29.4 MG/DL (ref 5–40)
ZINC SERPL-MCNC: 82 UG/DL (ref 60–130)

## 2019-10-18 LAB — VIT B1 BLD-MCNC: 80 UG/L (ref 38–122)

## 2019-10-31 ENCOUNTER — HOSPITAL ENCOUNTER (OUTPATIENT)
Dept: HOSPITAL 47 - LABPAT | Age: 39
Discharge: HOME | End: 2019-10-31
Payer: COMMERCIAL

## 2019-10-31 DIAGNOSIS — Z01.812: Primary | ICD-10-CM

## 2019-10-31 PROCEDURE — 93005 ELECTROCARDIOGRAM TRACING: CPT

## 2019-11-18 ENCOUNTER — HOSPITAL ENCOUNTER (OUTPATIENT)
Dept: HOSPITAL 47 - ORWHC2ENDO | Age: 39
Discharge: HOME | End: 2019-11-18
Payer: COMMERCIAL

## 2019-11-18 VITALS — TEMPERATURE: 97.3 F

## 2019-11-18 VITALS — DIASTOLIC BLOOD PRESSURE: 73 MMHG | HEART RATE: 88 BPM | SYSTOLIC BLOOD PRESSURE: 121 MMHG | RESPIRATION RATE: 20 BRPM

## 2019-11-18 VITALS — BODY MASS INDEX: 45.4 KG/M2

## 2019-11-18 DIAGNOSIS — Z98.51: ICD-10-CM

## 2019-11-18 DIAGNOSIS — Z98.890: ICD-10-CM

## 2019-11-18 DIAGNOSIS — E11.9: ICD-10-CM

## 2019-11-18 DIAGNOSIS — K29.80: ICD-10-CM

## 2019-11-18 DIAGNOSIS — Z82.49: ICD-10-CM

## 2019-11-18 DIAGNOSIS — Z91.018: ICD-10-CM

## 2019-11-18 DIAGNOSIS — K21.0: ICD-10-CM

## 2019-11-18 DIAGNOSIS — Z79.899: ICD-10-CM

## 2019-11-18 DIAGNOSIS — Z79.84: ICD-10-CM

## 2019-11-18 DIAGNOSIS — K22.10: ICD-10-CM

## 2019-11-18 DIAGNOSIS — K29.50: Primary | ICD-10-CM

## 2019-11-18 DIAGNOSIS — G47.33: ICD-10-CM

## 2019-11-18 DIAGNOSIS — E66.01: ICD-10-CM

## 2019-11-18 DIAGNOSIS — Z88.2: ICD-10-CM

## 2019-11-18 DIAGNOSIS — K31.7: ICD-10-CM

## 2019-11-18 DIAGNOSIS — Z87.01: ICD-10-CM

## 2019-11-18 DIAGNOSIS — Z99.89: ICD-10-CM

## 2019-11-18 LAB — GLUCOSE BLD-MCNC: 136 MG/DL (ref 75–99)

## 2019-11-18 PROCEDURE — 88305 TISSUE EXAM BY PATHOLOGIST: CPT

## 2019-11-18 PROCEDURE — 43239 EGD BIOPSY SINGLE/MULTIPLE: CPT

## 2019-11-18 NOTE — P.GSHP
History of Present Illness


H&P Date: 19














CHIEF COMPLAINT: GERD





HISTORY OF PRESENT ILLNESS: The patient is a 38-year-old male who


presents reports gastroesophageal reflux disease.  Upper endoscopy was offered 

for further evaluation and management.





PAST MEDICAL HISTORY: 


Please see list.





PAST SURGICAL HISTORY: 


Please see list.





MEDICATIONS: 


Please see list.





ALLERGIES:  Please see list. 





SOCIAL HISTORY: No illicit drug use





FAMILY HISTORY: No reports of Crohn disease or ulcerative colitis. 





REVIEW OF ORGAN SYSTEMS: 


CONSTITUTIONAL: No reports of fevers or chills. 


GI:  Denies any blood in stools or constipation. 





PHYSICAL EXAM: 


VITAL SIGNS:  Stable


GENERAL: Well-developed and pleasant in no acute distress. 


HEENT: No scleral icterus. Extraocular movements grossly


intact. Moist buccal mucosa. 


NECK: Supple without lymphadenopathy. 


CHEST: Unlabored respirations. Equal bilateral excursions. 


CARDIOVASCULAR: Regular rate and rhythm. Distal 2+ pulses. 


ABDOMEN: Soft, nondistended.  


MUSCULOSKELETAL: No clubbing, cyanosis, or edema. 





ASSESSMENT: 


1.  Gastroesophageal reflux disease





PLAN: 


1. Recommend proceeding with an upper endoscopy





Past Medical History


Past Medical History: Diabetes Mellitus, Pneumonia, Sleep Apnea/CPAP/BIPAP


Additional Past Medical History / Comment(s): back pain, numbness & tingling 

(bilateral legs with cold temperatures), not using c-pap though she was dx with 

sleep apnea 2014-15(??), hx. of ulcers


History of Any Multi-Drug Resistant Organisms: None Reported


Past Surgical History: Orthopedic Surgery, Tubal Ligation


Additional Past Surgical History / Comment(s): bilateral carpal tunnel surgery, 

left knee arthroscopic + repair of torn ligaments, Nerve ablation lumbar spine 

(Dr. Sadler in office procedure)


Past Anesthesia/Blood Transfusion Reactions: No Reported Reaction


Additional Past Anesthesia/Blood Transfusion Reaction / Comment(s): No blood 

transfusion to date


Smoking Status: Never smoker





- Past Family History


  ** Mother


Family Medical History: CVA/TIA, Hyperlipidemia, Hypertension





  ** Father


Family Medical History: Hyperlipidemia, Hypertension


Additional Family Medical History / Comment(s):  at age 60 from 

alcoholism (organ failure)





  ** Brother(s)


Family Medical History: No Reported History





Medications and Allergies


                                Home Medications











 Medication  Instructions  Recorded  Confirmed  Type


 


ARIPiprazole [Abilify] 5 mg PO DAILY 10/16/19 11/15/19 History


 


DULoxetine HCL [Cymbalta] 60 mg PO BID 10/16/19 11/15/19 History


 


traZODone HCL [TraZODone HCl] 100 mg PO HS 10/16/19 11/15/19 History


 


Gabapentin [Neurontin] 300 mg PO DAILY 11/15/19 11/15/19 History


 


metFORMIN HCL [Glucophage] 500 mg PO AC-SUPPER 11/15/19 11/15/19 History








                                    Allergies











Allergy/AdvReac Type Severity Reaction Status Date / Time


 


Sulfa (Sulfonamide Allergy  Rash/Hives Verified 11/15/19 10:20





Antibiotics)     


 


BANANAS Allergy  THROAT Uncoded 11/15/19 10:20





   SWELLS  


 


COCONUT Allergy  THROAT Uncoded 11/15/19 10:20





   SWELLS

## 2019-11-18 NOTE — P.PCN
Date of Procedure: 11/18/19


Description of Procedure: 











PREOPERATIVE DIAGNOSIS:


Gastroesophageal reflux disease.


Morbid obesity.





POSTOPERATIVE DIAGNOSIS:


Gastritis.


Gastroesophageal reflux disease.


Duodenitis


Gastric polyp


Morbid obesity.





OPERATION:


Esophagogastroduodenoscopy with biopsies along antrum and duodenum





SURGEON: Chayito Davis MD





ANESTHESIA: MAC.





INDICATIONS:


The patient is a 38-year-old female who presents with a history of reflux 

disease. Benefits and risks of the procedure were described. Informed consent wa

s obtained.





DESCRIPTION:


The patient was brought into the endoscopy suite and laid in the left lateral 

decubitus position. An Olympus gastroscope was passed along the posterior 

oropharynx down to the distal esophagus where the squamocolumnar junction was 

encountered at 39 cm from the incisors. The stomach was entered and no bile 

reflux was found.  Additional findings are listed below. Biopsies with cold 

forceps were obtained of the antrum. The first through third portion of the 

duodenum was examined. Retroflexion of the scope confirmed  Hill grade 2 lower 

esophageal valve. The squamocolumnar junction demonstrated LA grade A erosive 

esophagitis. The stomach was desufflated. The patient tolerated the procedure.





FINDINGS:


Squamocolumnar junction 39 cm from the incisors.


Diaphragmatic hiatus at 39 cm.


Hill grade 2 lower esophageal valve.


LA grade A erosive esophagitis.


Active duodenitis.


Chronic gastritis 


Few gastric polyps, benign





RECOMMENDATIONS:


Upper endoscopy as needed.





Plan - Discharge Summary


Discharge Rx Participant: No


New Discharge Prescriptions: 


New


   Omeprazole 40 mg PO DAILY #30 capsule.





No Action


   DULoxetine HCL [Cymbalta] 60 mg PO BID


   ARIPiprazole [Abilify] 5 mg PO DAILY


   traZODone HCL [TraZODone HCl] 100 mg PO HS


   metFORMIN HCL [Glucophage] 500 mg PO AC-SUPPER


   Gabapentin [Neurontin] 300 mg PO DAILY


Discharge Medication List





ARIPiprazole [Abilify] 5 mg PO DAILY 10/16/19 [History]


DULoxetine HCL [Cymbalta] 60 mg PO BID 10/16/19 [History]


traZODone HCL [TraZODone HCl] 100 mg PO HS 10/16/19 [History]


Gabapentin [Neurontin] 300 mg PO DAILY 11/15/19 [History]


metFORMIN HCL [Glucophage] 500 mg PO AC-SUPPER 11/15/19 [History]


Omeprazole 40 mg PO DAILY #30 capsule. 11/18/19 [Rx]








Follow up Appointment(s)/Referral(s): 


[facility] [facility] - 11/27/19


Patient Instructions/Handouts:  Gastritis (ED), Duodenitis (ED)


Discharge Disposition: HOME SELF-CARE

## 2020-01-29 ENCOUNTER — HOSPITAL ENCOUNTER (OUTPATIENT)
Dept: HOSPITAL 47 - BARWHC3 | Age: 40
Discharge: HOME | End: 2020-01-29
Payer: COMMERCIAL

## 2020-01-29 VITALS — BODY MASS INDEX: 45.9 KG/M2

## 2020-01-29 VITALS
SYSTOLIC BLOOD PRESSURE: 135 MMHG | HEART RATE: 92 BPM | RESPIRATION RATE: 16 BRPM | DIASTOLIC BLOOD PRESSURE: 86 MMHG | TEMPERATURE: 97.9 F

## 2020-01-29 DIAGNOSIS — M51.36: ICD-10-CM

## 2020-01-29 DIAGNOSIS — Z87.01: ICD-10-CM

## 2020-01-29 DIAGNOSIS — N92.1: ICD-10-CM

## 2020-01-29 DIAGNOSIS — F32.9: ICD-10-CM

## 2020-01-29 DIAGNOSIS — K21.9: ICD-10-CM

## 2020-01-29 DIAGNOSIS — Z79.899: ICD-10-CM

## 2020-01-29 DIAGNOSIS — Z91.018: ICD-10-CM

## 2020-01-29 DIAGNOSIS — M19.90: ICD-10-CM

## 2020-01-29 DIAGNOSIS — Z88.2: ICD-10-CM

## 2020-01-29 DIAGNOSIS — G47.33: ICD-10-CM

## 2020-01-29 DIAGNOSIS — E66.01: Primary | ICD-10-CM

## 2020-01-29 DIAGNOSIS — I10: ICD-10-CM

## 2020-01-29 PROCEDURE — 99211 OFF/OP EST MAY X REQ PHY/QHP: CPT

## 2020-01-29 NOTE — P.PN
Subjective


Progress Note Date: 01/29/20





DATE OF CONSULTATION:  01/29/2020





CHIEF COMPLAINT:  Morbid obesity





HISTORY OF PRESENT ILLNESS: Rhina Rasmussen is a pleasant 38-year-old female who 

is looking into the gastric bypass. She has gastroesophageal reflux disease. She

was started on Omeprazole and notices improvement of her symptoms. She is cu

rrently on medical supervised weight loss including exercising. 





At her height of 5 foot 4.5, her ideal body weight is 140 pounds  Her highest 

weight was 310 pounds, BMI 52.5. Her present weight is 271 pounds from 269 

pounds, 3 months ago. She has gained 2 pounds in 3 months.  She is 127 pounds 

overweight. Her current body mass index is 46.0. 





PAST MEDICAL HISTORY: 


1. Morbid obesity due to excess calories, BMI 52.5


2. Depression. 


3. Obstructive sleep apnea. 


4. Osteoarthritis. 


5. Hypertension. 


6. Metromenorrhagia. 


7. Past history of pneumonia


8.  Gastroesophageal reflux disease.


9.  Degenerative joint disease, lower back.





PAST SURGICAL HISTORY: 


1. Left knee surgery for torn ligament. 


2. Bilateral carpal tunnel release. 


3. Sleep study. 


4. Lumbar nerve ablation





MEDICATIONS: 


1. Adipex. 


2. Cymbalta.


3. Abilify. 





ALLERGIES: 


1. SULFA.


2. BANANA.


3. COCONUTS.  


 She denies any latex allergies. 





SOCIAL HISTORY: Lifelong nontobacco user. 





FAMILY HISTORY: Pertinent for breast cancer. Denies any gastrointestinal


malignancies or DVT or coagulopathy. Denies any food allergies. She has


history of morbid obesity in her family. 





REVIEW OF SYSTEMS: 


CONSTITUTIONAL: At her height of 5 foot 4.5, her ideal body weight is 140 pounds

 Her highest weight was 310 pounds, BMI 52.5. Her present weight is 271 pounds 

from 269 pounds, 3 months ago. 


HEENT: Denies any troubles with vision or hearing. 


ENDOCRINE: Denies any hypothyroidism or diabetes. 


CARDIOVASCULAR: No reports of chest pain or palpitations. 


RESPIRATORY: History of bronchitis. Has obstructive sleep apnea. History of 

pneumonia


GASTROINTESTINAL:  She denies any problems with diarrhea, constipation, bright 

red blood per rectum. Has gastroesophageal reflux disease.


GENITOURINARY: Has heave frequent menses. Had tubal ligation. 


MUSCULOSKELETAL: Has back pain including numbness and tingling of the toes and 

feet. 


PSYCH: Has history of anxiety and depression including mental illness for which 

she is on chronic mood stabilizers.


HEMATOLOGIC:  No DVTs or pulmonary embolisms. 


SKIN: Has panniculitis. Has rash.





PHYSICAL EXAM: 


VITAL SIGNS:  5 feet 4.5 inches, 271 pounds. BMI 46.0


                                   Vital Signs











Temp  97.9 F   01/29/20 16:05


 


Pulse  92   01/29/20 16:05


 


Resp  16   01/29/20 16:05


 


BP  135/86   01/29/20 16:05


 


Pulse Ox      














GENERAL: Well-developed in no acute distress.  


HEENT: No scleral icterus.  Extraocular movements grossly intact.  Hears 

conversational speech.  No nasal drainage.


NECK: Supple without lymphadenopathy. 


CHEST: Nonlabored respirations with equal bilateral excursions. 


CARDIOVASCULAR: Regular rate and rhythm. Distal 2+ pulses. 


ABDOMEN: Obese, soft, nontender, nondistended. 


MUSCULOSKELETAL: No clubbing, cyanosis. 


NEURO: No focal or lateralizing signs. Cranial nerves 2 through 12 grossly 

within normal limits. 


PSYCH: Appropriate affect. Alert and oriented to person, place and time.


SKIN: Good skin turgor.  Well perfused.





EKG: Sinus tachycardia, borderline





LABS: Hgb A1c 6.6, Cholesterol elevated 265, Vitamin D is low, PTH is elevated





MEDICAL REPORT:


EGD FINDINGS:


Squamocolumnar junction 39 cm from the incisors.


Diaphragmatic hiatus at 39 cm.


Hill grade 2 lower esophageal valve.


LA grade A erosive esophagitis.


Active duodenitis.


Chronic gastritis 


Few gastric polyps, benign








Final Pathologic Diagnosis


A. GASTRIC ANTRUM, BIOPSIES: Essentially normal gastric mucosa.





Helicobacter organisms are not identified on routine H+E stained sections.





B. DUODENUM, BIOPSIES: Benign enteric mucosa with intact villous architecture.








ASSESSMENT: 


1. Morbid obesity due to excess calories, BMI 53.3 to 46.0


2. Depression. 


3. Obstructive sleep apnea. 


4. Osteoarthritis. 


5. Hypertension. 


6. Metromenorrhagia. 


7. Past history of pneumonia


8.  Gastroesophageal reflux disease.


9.  Degenerative joint disease, lower back. 


10.  Vitamin D deficiency


11.  Gastric polyps


12.  Diabetes type 2, new diagnosis


13.  Vitamin D deficiency


14.  Hyperlipidemia


15.  Secondary hyperparathyroidism





PLAN: 


1. Recommend medical supervised weight loss to April 2020


2. At this time, psych consult is pending per insurance guidelines


3. She is pending PCP letter


4. For vitamin D deficiency, Vitamin D is prescribed


5. She is looking gastric bypass at this time.


6. Blood work shows new diagnosis of diabetes. May need new pharmacological 

treatment.


7.  Vitamin D 31101 units weekly advised. 


8.  Patient presents with new elevated risks with additional co-morbidities


9.  Cardiology assessment advised. 




















Objective





- Vital Signs


Vital signs: 


                                   Vital Signs











Temp  97.9 F   01/29/20 16:05


 


Pulse  92   01/29/20 16:05


 


Resp  16   01/29/20 16:05


 


BP  135/86   01/29/20 16:05


 


Pulse Ox      








                                 Intake & Output











 01/28/20 01/29/20 01/29/20





 18:59 06:59 18:59


 


Weight   123.377 kg

## 2020-11-19 ENCOUNTER — HOSPITAL ENCOUNTER (OUTPATIENT)
Dept: HOSPITAL 47 - RADMAMWWP | Age: 40
Discharge: HOME | End: 2020-11-19
Attending: FAMILY MEDICINE
Payer: COMMERCIAL

## 2020-11-19 DIAGNOSIS — Z12.31: Primary | ICD-10-CM

## 2020-11-19 PROCEDURE — 77067 SCR MAMMO BI INCL CAD: CPT

## 2020-11-19 NOTE — MM
Reason for exam: screening  (asymptomatic).

Baseline mammogram.



History:

Family history of breast cancer in maternal aunt and ovarian cancer in maternal 

aunt.

Took hormonal contraceptives for 1 year beginning at age 18.



Physical Findings:

Nurse did not find any significant physical abnormalities on exam.



MG Screening Mammo w CAD

Bilateral CC and MLO view(s) were taken.  XCCL view(s) were taken of the left 

breast.

There is no discrete abnormality.



These results were verbally communicated with the patient and result sheet given 

to the patient on 11/19/20.





ASSESSMENT: Benign, BI-RAD 2



RECOMMENDATION:

Routine screening mammogram of both breasts in 1 year.

## 2022-12-15 ENCOUNTER — HOSPITAL ENCOUNTER (EMERGENCY)
Dept: HOSPITAL 47 - EC | Age: 42
Discharge: HOME | End: 2022-12-15
Payer: COMMERCIAL

## 2022-12-15 VITALS
RESPIRATION RATE: 18 BRPM | HEART RATE: 77 BPM | TEMPERATURE: 97.6 F | DIASTOLIC BLOOD PRESSURE: 105 MMHG | SYSTOLIC BLOOD PRESSURE: 140 MMHG

## 2022-12-15 DIAGNOSIS — Z79.84: ICD-10-CM

## 2022-12-15 DIAGNOSIS — Z79.899: ICD-10-CM

## 2022-12-15 DIAGNOSIS — S93.601A: ICD-10-CM

## 2022-12-15 DIAGNOSIS — S83.91XA: Primary | ICD-10-CM
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
## 2022-12-15 DIAGNOSIS — Z91.018: ICD-10-CM

## 2022-12-15 DIAGNOSIS — Z88.2: ICD-10-CM

## 2022-12-15 DIAGNOSIS — F32.A: ICD-10-CM

## 2022-12-15 DIAGNOSIS — F41.9: ICD-10-CM

## 2022-12-15 PROCEDURE — 99283 EMERGENCY DEPT VISIT LOW MDM: CPT

## 2022-12-15 NOTE — XR
EXAMINATION TYPE: XR knee complete RT

 

DATE OF EXAM: 12/15/2022

 

COMPARISON: 6/13/2019

 

HISTORY: Fall, pain

 

TECHNIQUE: 3 view right knee

 

FINDINGS: Joint spaces are preserved. Small joint effusion may be present. No acute fractures or disl
ocations are evident. MRI could be performed if evaluation of soft tissues would be of benefit. Follo
w up exams can be performed 710 days from acute trauma for continued pain.

 

IMPRESSION:

1.  No acute osseous abnormality.

2. Suggestion of a small joint effusion.

## 2022-12-15 NOTE — XR
EXAMINATION TYPE: XR foot complete RT

 

DATE OF EXAM: 12/15/2022

 

COMPARISON: None

 

HISTORY: Fall, pain

 

TECHNIQUE: 3 view right foot

 

FINDINGS: No acute fracture or dislocation is evident. Joint spaces are preserved. Soft tissues appea
r normal.

 

Follow up exams can be performed 7-10 days. Acute trauma for continued pain.

 

IMPRESSION:

1.  No acute osseous abnormality.

## 2022-12-15 NOTE — ED
General Adult HPI





- General


Chief complaint: Extremity Injury, Lower


Stated complaint: Fall, right leg pain


Time Seen by Provider: 12/15/22 06:54


Source: patient, RN notes reviewed


Mode of arrival: wheelchair


Limitations: no limitations





- History of Present Illness


Initial comments: 


41-year-old female presents emergency Department with chief complaint of sudden 

fall.  Patient states she is going on her driveway slopes states that she hit 

some ice slipped, fall.  Patient states she twisted her knee went to right foot 

pain no head injury no loss conscious.  Patient states that she has increased 

pain with weightbearing she does have some mild foot tenderness and pain without

weightbearing no paresthesias no back pain no hip pain no other associated 

complaints.








- Related Data


                                Home Medications











 Medication  Instructions  Recorded  Confirmed


 


ARIPiprazole [Abilify] 5 mg PO DAILY 10/16/19 01/29/20


 


DULoxetine HCL [Cymbalta] 60 mg PO BID 10/16/19 01/29/20


 


traZODone HCL [TraZODone HCl] 100 mg PO HS 10/16/19 01/29/20


 


metFORMIN HCL [Glucophage] 500 mg PO AC-SUPPER 11/15/19 01/29/20








                                  Previous Rx's











 Medication  Instructions  Recorded


 


Omeprazole 40 mg PO DAILY #30 capsule. 19


 


Ergocalciferol [Vitamin D2 50,000 unit PO Q7D #12 cap 20





(DRISDOL)]  


 


LORazepam [Ativan] 1 mg PO BID 3 Days #6 tab 22











                                    Allergies











Allergy/AdvReac Type Severity Reaction Status Date / Time


 


Sulfa (Sulfonamide Allergy  Rash/Hives Verified 12/15/22 06:50





Antibiotics)     


 


BANANAS Allergy  THROAT Uncoded 12/15/22 06:50





   SWELLS  


 


COCONUT Allergy  THROAT Uncoded 12/15/22 06:50





   SWELLS  














Review of Systems


ROS Statement: 


Those systems with pertinent positive or pertinent negative responses have been 

documented in the HPI.





ROS Other: All systems not noted in ROS Statement are negative.





Past Medical History


Past Medical History: Pneumonia, Sleep Apnea/CPAP/BIPAP


Additional Past Medical History / Comment(s): back pain, numbness & tingling 

(bilateral legs with cold temperatures), not using c-pap though she was dx with 

sleep apnea 2014-15(??), prediabetes,


History of Any Multi-Drug Resistant Organisms: None Reported


Past Surgical History: Orthopedic Surgery, Tubal Ligation


Additional Past Surgical History / Comment(s): bilateral carpul tunnel surgery, 

left knee arthroscopic + repair of torn ligaments, 2019: Nerve ablation 

lumbar spine (Dr. Dupree; in office procedure)


Past Anesthesia/Blood Transfusion Reactions: No Reported Reaction


Additional Past Anesthesia/Blood Transfusion Reaction / Comment(s): No blood 

transfusion to date


Past Psychological History: Anxiety, Depression


Smoking Status: Never smoker


Past Alcohol Use History: Occasional


Past Drug Use History: None Reported





- Past Family History


  ** Mother


Family Medical History: CVA/TIA, Hyperlipidemia, Hypertension





  ** Father


Family Medical History: Hyperlipidemia, Hypertension


Additional Family Medical History / Comment(s):  at age 60 from 

alcoholism (organ failure)





  ** Brother(s)


Family Medical History: No Reported History





General Exam


Limitations: no limitations


General appearance: alert, in no apparent distress


Head exam: Present: atraumatic, normocephalic, normal inspection


Eye exam: Present: normal appearance, PERRL, EOMI.  Absent: scleral icterus, 

conjunctival injection, periorbital swelling


Respiratory exam: Present: normal lung sounds bilaterally.  Absent: respiratory 

distress, wheezes, rales, rhonchi, stridor


Cardiovascular Exam: Present: regular rate, normal rhythm, normal heart sounds. 

 Absent: systolic murmur, diastolic murmur, rubs, gallop, clicks


Extremities exam: Present: other (Mild tenderness right knee, right foot, 

neurovascular intact, no obvious deformity, no hip tenderness or proximal femur 

tenderness no malleoli tenderness patient is able to bear weight)





Course


                                   Vital Signs











  12/15/22





  06:51


 


Temperature 97 F L


 


Pulse Rate 83


 


Respiratory 16





Rate 


 


Blood Pressure 146/94


 


O2 Sat by Pulse 98





Oximetry 














Medical Decision Making





- Medical Decision Making


X-ray of the foot was obtained and interpreted by me no acute fracture no os

seous lesion, x-ray right knee interpreted by me and radiologist no acute 

fractures small joint effusion.  Patient has a right knee sprain concerning for 

ligamentous injury given her fall.  Patient follow-up with orthopedics return 

parameters were discussed.








Disposition


Clinical Impression: 


 Right knee sprain, Right foot sprain





Disposition: HOME SELF-CARE


Condition: Stable


Instructions (If sedation given, give patient instructions):  Foot Sprain (ED), 

Knee Sprain (ED)


Additional Instructions: 


Please return to the Emergency Department if symptoms worsen or any other 

concerns.


Is patient prescribed a controlled substance at d/c from ED?: No


Referrals: 


Luna Webber DO [Primary Care Provider] - 1-2 days


Rinku Richardson MD [STAFF PHYSICIAN] - 1-2 days


Time of Disposition: 08:06

## 2023-01-18 ENCOUNTER — HOSPITAL ENCOUNTER (EMERGENCY)
Dept: HOSPITAL 47 - EC | Age: 43
Discharge: HOME | End: 2023-01-18
Payer: COMMERCIAL

## 2023-01-18 VITALS — SYSTOLIC BLOOD PRESSURE: 139 MMHG | HEART RATE: 87 BPM | RESPIRATION RATE: 18 BRPM | DIASTOLIC BLOOD PRESSURE: 86 MMHG

## 2023-01-18 VITALS — TEMPERATURE: 97.9 F

## 2023-01-18 DIAGNOSIS — G47.30: ICD-10-CM

## 2023-01-18 DIAGNOSIS — F32.A: ICD-10-CM

## 2023-01-18 DIAGNOSIS — Z88.2: ICD-10-CM

## 2023-01-18 DIAGNOSIS — G44.209: Primary | ICD-10-CM

## 2023-01-18 DIAGNOSIS — J32.0: ICD-10-CM

## 2023-01-18 DIAGNOSIS — Z91.018: ICD-10-CM

## 2023-01-18 DIAGNOSIS — F41.9: ICD-10-CM

## 2023-01-18 PROCEDURE — 99284 EMERGENCY DEPT VISIT MOD MDM: CPT

## 2023-01-18 PROCEDURE — 70450 CT HEAD/BRAIN W/O DYE: CPT

## 2023-01-18 PROCEDURE — 96372 THER/PROPH/DIAG INJ SC/IM: CPT

## 2023-01-18 NOTE — ED
General Adult HPI





- General


Chief complaint: Headache


Stated complaint: Headache


Time Seen by Provider: 23 18:35


Source: patient, RN notes reviewed


Mode of arrival: ambulatory


Limitations: no limitations





- History of Present Illness


Initial comments: 


42-year-old  female with no significant past medical history presents 

to the emergency department with a chief complaint of headache x 1 month. She 

describes her headache as starting at the base of her neck which radiates up and

feels like a tight band around her head. She saw her PCP for this and was given 

toradol which has provided mild relief of her symptoms. She denies dizziness, 

lightheadedness, nausea, vomiting, photophobia. Denies history of migraines.  





- Related Data


                                Home Medications











 Medication  Instructions  Recorded  Confirmed


 


ARIPiprazole [Abilify] 5 mg PO DAILY 10/16/19 01/29/20


 


DULoxetine HCL [Cymbalta] 60 mg PO BID 10/16/19 01/29/20


 


traZODone HCL [TraZODone HCl] 100 mg PO HS 10/16/19 01/29/20


 


metFORMIN HCL [Glucophage] 500 mg PO AC-SUPPER 11/15/19 01/29/20








                                  Previous Rx's











 Medication  Instructions  Recorded


 


Omeprazole 40 mg PO DAILY #30 capsule. 19


 


Ergocalciferol [Vitamin D2 50,000 unit PO Q7D #12 cap 20





(DRISDOL)]  


 


LORazepam [Ativan] 1 mg PO BID 3 Days #6 tab 22


 


Ketorolac [Toradol] 10 mg PO Q8HR #15 tab 23











                                    Allergies











Allergy/AdvReac Type Severity Reaction Status Date / Time


 


Sulfa (Sulfonamide Allergy  Rash/Hives Verified 23 18:28





Antibiotics)     


 


BANANAS Allergy  THROAT Uncoded 23 18:28





   SWELLS  


 


COCONUT Allergy  THROAT Uncoded 23 18:28





   MÓNICA  














Review of Systems


ROS Statement: 


Those systems with pertinent positive or pertinent negative responses have been 

documented in the HPI.





ROS Other: All systems not noted in ROS Statement are negative.





Past Medical History


Past Medical History: Pneumonia, Sleep Apnea/CPAP/BIPAP


Additional Past Medical History / Comment(s): back pain, numbness & tingling 

(bilateral legs with cold temperatures), not using c-pap though she was dx with 

sleep apnea 15(??), prediabetes,


History of Any Multi-Drug Resistant Organisms: None Reported


Past Surgical History: Orthopedic Surgery, Tubal Ligation


Additional Past Surgical History / Comment(s): bilateral carpul tunnel surgery, 

left knee arthroscopic + repair of torn ligaments, 2019: Nerve ablation 

lumbar spine (Dr. Dupree; in office procedure)


Past Anesthesia/Blood Transfusion Reactions: No Reported Reaction


Additional Past Anesthesia/Blood Transfusion Reaction / Comment(s): No blood 

transfusion to date


Past Psychological History: Anxiety, Depression


Smoking Status: Never smoker


Past Alcohol Use History: Occasional


Past Drug Use History: None Reported





- Past Family History


  ** Mother


Family Medical History: CVA/TIA, Hyperlipidemia, Hypertension





  ** Father


Family Medical History: Hyperlipidemia, Hypertension


Additional Family Medical History / Comment(s):  at age 60 from 

alcoholism (organ failure)





  ** Brother(s)


Family Medical History: No Reported History





General Exam


Limitations: no limitations


General appearance: alert, in no apparent distress


Head exam: Present: atraumatic, normocephalic, normal inspection


Eye exam: Present: normal appearance, PERRL, EOMI.  Absent: scleral icterus, 

conjunctival injection, periorbital swelling


ENT exam: Present: normal exam, mucous membranes moist


Neck exam: Present: normal inspection.  Absent: tenderness, meningismus, 

lymphadenopathy


Respiratory exam: Present: normal lung sounds bilaterally.  Absent: respiratory 

distress, wheezes, rales, rhonchi, stridor


Cardiovascular Exam: Present: regular rate, normal rhythm, normal heart sounds. 

 Absent: systolic murmur, diastolic murmur, rubs, gallop, clicks


GI/Abdominal exam: Present: soft, normal bowel sounds.  Absent: distended, 

tenderness, guarding, rebound, rigid


Extremities exam: Present: normal inspection, full ROM, normal capillary refill.

  Absent: tenderness, pedal edema, joint swelling, calf tenderness


Back exam: Present: normal inspection


Neurological exam: Present: alert, oriented X3, CN II-XII intact


Psychiatric exam: Present: normal affect, normal mood


Skin exam: Present: warm, dry, intact, normal color.  Absent: rash





Course


                                   Vital Signs











  23





  18:25 20:32


 


Temperature 97.9 F 


 


Pulse Rate 85 87


 


Respiratory 16 18





Rate  


 


Blood Pressure 117/80 139/86


 


O2 Sat by Pulse 98 96





Oximetry  














- Reevaluation(s)


Reevaluation #1: 





23 19:24


Patient reevaluated.  Patient reports symptomatic relief status post Toradol.


23 19:24








Medical Decision Making





- Medical Decision Making





Was pt. sent in by a medical professional or institution ([facility]Y [facility], NP, urgent 

care, hospital, or nursing home...) When possible be specific


@  -[No]


Did you speak to anyone other than the patient for history (EMS, parent, family,

 police, friend...)? What history was obtained from this source 


@  -[No]


Did you review nursing and triage notes (agree or disagree)?  Why? 


@  -[I reviewed and agree with nursing and triage notes]


Were old charts reviewed (outside hosp., previous admission, EMS record, old 

EKG, old radiological studies, urgent care reports/EKG's, nursing home records)?

Report findings 


@  -[No old charts were reviewed]


Differential Diagnosis (chest pain, altered mental status, abdominal pain women,

abdominal pain men, vaginal bleeding, weakness, fever, dyspnea, syncope, 

headache, dizziness, GI bleed, back pain, seizure, CVA, palpatations, mental 

health)? 


@  -[not applicable]


EKG interpreted by me (3pts min.).


@  -[As above]


X-rays interpreted by me (1pt min.).


@  -[None done]


CT interpreted by me (1pt min.).


@  -negative for acute intracranial process.


U/S interpreted by me (1pt. min.).


@  -[None done]


What testing was considered but not performed or refused? (CT, X-rays, U/S, 

labs)? Why?


@  -[None]


What meds were considered but not given or refused? Why?


@  -[None]


Did you discuss the management of the patient with other professionals 

(professionals i.e. [facility], NP, lab, RT, psych nurse, , , 

teacher, , )? Give summary


@  -[No]


Was smoking cessation discussed for >3mins.?


@  -[No]


Was critical care preformed (if so, how long)?


@  -[No]


Were there social determinants of health that impacted care today? How? 

(Homelessness, low income, unemployed, alcoholism, drug addiction, 

transportation, low edu. Level, literacy, decrease access to med. care, long term, 

rehab)?


@  -[No]


Was there de-escalation of care discussed even if they declined (Discuss DNR or 

withdrawal of care, Hospice)? DNR status


@  -[No]


What co-morbidities impacted this encounter? (DM, HTN, Smoking, COPD, CAD, 

Cancer, CVA, ARF, Chemo, Hep., AIDS, mental health diagnosis, sleep apnea, 

morbid obesity)?


@  -[None]


Was patient admitted / discharged? Hospital course, mention meds given and 

route, prescriptions, significant lab abnormalities, going to OR and other 

pertinent info.


@  -42-year-old  female presents to the emergency department with 

headache. She had a history and physical performed while in the emergency 

department physical exam essentially unremarkable. Patient was given toradol 

with symptomatic relief. CT negative. He was given a prescription for toradol. 

He was encouraged to follow up with his primary care doctor within 1-2 days if 

symptoms worsen or persist.  Return precautions were discussed.  Patient was 

discharged in stable condition all questions and comments were addressed.  I 

discussed the case with Dr. Mccarty, TAMMY who agrees with the plan of care.


Undiagnosed new problem with uncertain prognosis?


@  -[No]


Drug Therapy requiring intensive monitoring for toxicity (Heparin, Nitro, 

Insulin, Cardizem)?


@  -[No]


Were any procedures done?


@  -[No]


Diagnosis/symptom?


@  -tension headache 


Acute, or Chronic, or Acute on Chronic?


@  -acute 


Uncomplicated (without systemic symptoms) or Complicated (systemic symptoms)?


@  -uncomplicated 


Side effects of treatment?


@  -[No]


Exacerbation, Progression, or Severe Exacerbation?


@  -[No]


Poses a threat to life or bodily function? How? (Chest pain, USA, MI, pneumonia,

 PE, COPD, DKA, ARF, appy, cholecystitis, CVA, Diverticulitis, Homicidal, 

Suicidal, threat to staff... and all critical care pts)


@  -[No]





Disposition


Clinical Impression: 


 Tension headache





Disposition: HOME SELF-CARE


Condition: Stable


Instructions (If sedation given, give patient instructions):  Acute Headache 

(ED)


Additional Instructions: 


These return to the nearest emergency department if worse symptoms worsen or 

persist.


Prescriptions: 


Ketorolac [Toradol] 10 mg PO Q8HR #15 tab


Is patient prescribed a controlled substance at d/c from ED?: No


Referrals: 


Luna Webber DO [Primary Care Provider] - 1-2 days


Time of Disposition: 20:21

## 2023-01-18 NOTE — CT
EXAMINATION TYPE: CT brain wo con

CT DLP: 1143.4 mGycm, Automated exposure control for dose reduction was used.

 

DATE OF EXAM: 1/18/2023 7:08 PM

 

COMPARISON: Prior CT Brain from 10/28/2017.

 

CLINICAL INDICATION:Female, 42 years old with history of headache, posterior headache that wraps to t
he front x few months

 

TECHNIQUE: 

Brain: Multiple axial CT images of the brain were obtained without IV contrast. 

 

FINDINGS:

 

Brain:

Extra-axial spaces: No abnormal extra-axial fluid collections.

Ventricular system: Within normal limits

Cerebral parenchyma: No acute intraparenchymal hemorrhage or mass effect.  The gray-white junction is
 well differentiated. 

Cerebellum: Unremarkable.

Mass effect: No evidence of midline shift.

Intracranial vasculature: unremarkable

Soft tissues: Normal.

Calvarium/osseous structures: No depressed skull fracture.

Paranasal sinuses and mastoid air cells: Extensive mucosal thickening of the bilateral maxillary sinu
ses.

Visualized orbits: Orbital contents are intact.

 

 

IMPRESSION:

1.  No acute intracranial process.

2.  Maxillary sinusitis.

## 2023-04-22 ENCOUNTER — HOSPITAL ENCOUNTER (OUTPATIENT)
Dept: HOSPITAL 47 - LABWHC1 | Age: 43
Discharge: HOME | End: 2023-04-22
Attending: PSYCHIATRY & NEUROLOGY
Payer: COMMERCIAL

## 2023-04-22 DIAGNOSIS — M79.10: ICD-10-CM

## 2023-04-22 DIAGNOSIS — R26.9: ICD-10-CM

## 2023-04-22 DIAGNOSIS — R25.2: ICD-10-CM

## 2023-04-22 DIAGNOSIS — I49.9: ICD-10-CM

## 2023-04-22 DIAGNOSIS — M19.90: Primary | ICD-10-CM

## 2023-04-22 DIAGNOSIS — R20.8: ICD-10-CM

## 2023-04-22 LAB
ERYTHROCYTE [DISTWIDTH] IN BLOOD BY AUTOMATED COUNT: 4.77 X 10*6/UL (ref 4.1–5.2)
ERYTHROCYTE [DISTWIDTH] IN BLOOD: 14 % (ref 11.5–14.5)
HCT VFR BLD AUTO: 44.1 % (ref 37.2–46.3)
HGB BLD-MCNC: 14 G/DL (ref 12–15)
MCH RBC QN AUTO: 29.4 PG (ref 27–32)
MCHC RBC AUTO-ENTMCNC: 31.7 G/DL (ref 32–37)
MCV RBC AUTO: 92.5 FL (ref 80–97)
NRBC BLD AUTO-RTO: 0 /100 WBCS (ref 0–0)
PLATELET # BLD AUTO: 247 X 10*3/UL (ref 140–440)
WBC # BLD AUTO: 8.93 X 10*3/UL (ref 4.5–10)

## 2023-04-22 PROCEDURE — 82550 ASSAY OF CK (CPK): CPT

## 2023-04-22 PROCEDURE — 36415 COLL VENOUS BLD VENIPUNCTURE: CPT

## 2023-04-22 PROCEDURE — 93005 ELECTROCARDIOGRAM TRACING: CPT

## 2023-04-22 PROCEDURE — 82085 ASSAY OF ALDOLASE: CPT

## 2023-04-22 PROCEDURE — 86140 C-REACTIVE PROTEIN: CPT

## 2023-04-22 PROCEDURE — 85652 RBC SED RATE AUTOMATED: CPT

## 2023-04-22 PROCEDURE — 85027 COMPLETE CBC AUTOMATED: CPT

## 2023-04-23 LAB — CK SERPL-CCNC: 48 U/L (ref 26–186)

## 2023-06-07 ENCOUNTER — HOSPITAL ENCOUNTER (OUTPATIENT)
Dept: HOSPITAL 47 - LABWHC1 | Age: 43
Discharge: HOME | End: 2023-06-07
Attending: PSYCHIATRY & NEUROLOGY
Payer: COMMERCIAL

## 2023-06-07 DIAGNOSIS — I49.9: Primary | ICD-10-CM

## 2023-06-07 PROCEDURE — 93005 ELECTROCARDIOGRAM TRACING: CPT

## 2023-06-07 PROCEDURE — 36415 COLL VENOUS BLD VENIPUNCTURE: CPT

## 2023-10-28 ENCOUNTER — HOSPITAL ENCOUNTER (EMERGENCY)
Dept: HOSPITAL 47 - EC | Age: 43
Discharge: HOME | End: 2023-10-28
Payer: COMMERCIAL

## 2023-10-28 VITALS
DIASTOLIC BLOOD PRESSURE: 79 MMHG | SYSTOLIC BLOOD PRESSURE: 134 MMHG | TEMPERATURE: 97.4 F | RESPIRATION RATE: 18 BRPM | HEART RATE: 82 BPM

## 2023-10-28 DIAGNOSIS — G47.30: ICD-10-CM

## 2023-10-28 DIAGNOSIS — F32.A: ICD-10-CM

## 2023-10-28 DIAGNOSIS — Z91.018: ICD-10-CM

## 2023-10-28 DIAGNOSIS — Z88.2: ICD-10-CM

## 2023-10-28 DIAGNOSIS — J02.9: ICD-10-CM

## 2023-10-28 DIAGNOSIS — F41.9: ICD-10-CM

## 2023-10-28 DIAGNOSIS — Z79.899: ICD-10-CM

## 2023-10-28 DIAGNOSIS — J04.0: Primary | ICD-10-CM

## 2023-10-28 PROCEDURE — 71045 X-RAY EXAM CHEST 1 VIEW: CPT

## 2023-10-28 PROCEDURE — 70360 X-RAY EXAM OF NECK: CPT

## 2023-10-28 PROCEDURE — 96372 THER/PROPH/DIAG INJ SC/IM: CPT

## 2023-10-28 PROCEDURE — 99283 EMERGENCY DEPT VISIT LOW MDM: CPT

## 2023-10-28 NOTE — XR
EXAMINATION TYPE: XR soft tissue neck

 

DATE OF EXAM: 10/28/2023

 

COMPARISON: 3/30/2018

 

HISTORY: Cough congestion

 

TECHNIQUE: 2 view soft tissue neck

 

FINDINGS: Prevertebral space is normal. Disc heights are preserved. Vertebral body heights are preser
jacob. Posterior spinal lamellar line is intact. Epiglottis is visualized appears normal. Subglottic ai
rway is unremarkable.

 

IMPRESSION:

1.  Unremarkable soft tissue neck

## 2023-10-28 NOTE — XR
EXAMINATION TYPE: XR chest 1V portable

 

DATE OF EXAM: 10/28/2023

 

COMPARISON: 8/21/2022

 

INDICATION: Cough congestion

 

TECHNIQUE: Single frontal view of the chest is obtained.

 

FINDINGS:  

The heart size is normal.  

The pulmonary vasculature is normal.  

The lungs are clear.  

 

 

IMPRESSION:  

1. No acute pulmonary process.

## 2023-10-28 NOTE — ED
URI HPI





- General


Chief Complaint: Upper Respiratory Infection


Stated Complaint: ENT


Time Seen by Provider: 10/28/23 16:44


Source: patient, RN notes reviewed, old records reviewed


Mode of arrival: ambulatory


Limitations: no limitations





- History of Present Illness


Initial Comments: 





This is a 42-year-old female to the emergency department for evaluation of sore 

throat, scratchy voice, neck discomfort with following a postnasal drip.  

Patient has recently been on antibiotics azithromycin Z-Dawood which is finished.  

Patient states symptoms initially improved with returned.  No fevers


MD Complaint: cough, sore throat, nasal congestion


-: days(s)


Severity: mild


Consistency: constant


Improves With: nothing


Worsens With: nothing


Associated Symptoms: denies other symptoms


Treatments Prior to Arrival: none





- Related Data


                                Home Medications











 Medication  Instructions  Recorded  Confirmed


 


ARIPiprazole [Abilify] 5 mg PO HS 10/16/19 05/05/23


 


DULoxetine HCL [Cymbalta] 60 mg PO BID 10/16/19 05/05/23


 


metFORMIN HCL [Glucophage] 500 mg PO AC-SUPPER 11/15/19 05/05/23


 


Dulaglutide [Trulicity] 1.5 mg SQ MCCANN 23


 


HYDROcodone/APAP 5-325MG [Norco 1 tab PO Q6HR PRN 23





5-325]   


 


Ibuprofen [Motrin] 800 mg PO Q8H PRN 23


 


Pregabalin [Lyrica] 150 mg PO TID 23


 


traZODone  mg PO HS 23








                                  Previous Rx's











 Medication  Instructions  Recorded


 


predniSONE 50 mg PO DAILY #5 tab 10/28/23











                                    Allergies











Allergy/AdvReac Type Severity Reaction Status Date / Time


 


Sulfa (Sulfonamide Allergy  Rash/Hives Verified 23 08:58





Antibiotics)     


 


BANANAS Allergy  THROAT Uncoded 23 08:58





   SWELLS  


 


COCONUT Allergy  THROAT Uncoded 23 08:58





   SWELLS  














Review of Systems


ROS Statement: 


Those systems with pertinent positive or pertinent negative responses have been 

documented in the HPI.





ROS Other: All systems not noted in ROS Statement are negative.





Past Medical History


Past Medical History: Fibromyalgia, Pneumonia, Sleep Apnea/CPAP/BIPAP


Additional Past Medical History / Comment(s): back pain, numbness & tingling 

(bilateral legs with cold temperatures), not using c-pap though she was dx with 

sleep apnea -15(??), prediabetes,


History of Any Multi-Drug Resistant Organisms: None Reported


Past Surgical History: Uterine Ablation


Additional Past Surgical History / Comment(s): bilateral carpul tunnel surgery, 

left knee arthroscopic + repair of torn ligaments, 2019: Nerve ablation 

lumbar spine (Dr. Dupree; in office procedure)


Past Anesthesia/Blood Transfusion Reactions: No Reported Reaction


Additional Past Anesthesia/Blood Transfusion Reaction / Comment(s): No blood 

transfusion to date


Past Psychological History: Anxiety, Depression


Smoking Status: Never smoker





- Past Family History


  ** Mother


Family Medical History: CVA/TIA, Hyperlipidemia, Hypertension





  ** Father


Family Medical History: Hyperlipidemia, Hypertension


Additional Family Medical History / Comment(s):  at age 60 from 

alcoholism (organ failure)





  ** Brother(s)


Family Medical History: No Reported History





General Exam





- General Exam Comments


Initial Comments: 





No stridor


Limitations: no limitations


General appearance: alert, in no apparent distress


Head exam: Present: atraumatic, normocephalic, normal inspection


Eye exam: Present: normal appearance, PERRL, EOMI.  Absent: scleral icterus, 

conjunctival injection, periorbital swelling


ENT exam: Present: normal exam, mucous membranes moist


Neck exam: Present: normal inspection.  Absent: tenderness, meningismus, 

lymphadenopathy


Respiratory exam: Present: normal lung sounds bilaterally.  Absent: respiratory 

distress, wheezes, rales, rhonchi, stridor


Cardiovascular Exam: Present: regular rate, normal rhythm, normal heart sounds. 

 Absent: systolic murmur, diastolic murmur, rubs, gallop, clicks


GI/Abdominal exam: Present: soft, normal bowel sounds.  Absent: distended, 

tenderness, guarding, rebound, rigid


Extremities exam: Present: normal inspection, full ROM, normal capillary refill.

  Absent: tenderness, pedal edema, joint swelling, calf tenderness


Back exam: Present: normal inspection


Neurological exam: Present: alert, oriented X3, CN II-XII intact


Psychiatric exam: Present: normal affect, normal mood


Skin exam: Present: warm, dry, intact, normal color.  Absent: rash





Course


                                   Vital Signs











  10/28/23 10/28/23





  16:38 19:12


 


Temperature 98.8 F 97.4 F L


 


Pulse Rate 100 82


 


Respiratory 20 18





Rate  


 


Blood Pressure 135/88 134/79


 


O2 Sat by Pulse 96 97





Oximetry  














- Reevaluation(s)


Reevaluation #1: 





10/28/23 17:51


Medical record is reviewed


Reevaluation #2: 





10/28/23 18:57


Patient symptoms are improved


Reevaluation #3: 





10/28/23 18:57


patient 39 results and questions have been answered


Reevaluation #4: 





10/28/23 17:51


Was pt. sent in by a medical professional or institution (, PA, NP, urgent 

care, hospital, or nursing home...) When possible be specific


@  -no


Did you speak to anyone other than the patient for history (EMS, parent, family,

 police, friend...)? What history was obtained from this source 


@  -no


Did you review nursing and triage notes (agree or disagree)?  Why? 


@  -agree


Are old charts reviewed (outside hosp., previous admission, EMS record, old EKG,

 old radiological studies, urgent care reports/EKG's, nursing home records)? 

Report findings 


@  -yes


Differential Diagnosis (chest pain, altered mental status, abdominal pain women,

 abdominal pain men, vaginal bleeding, weakness, fever, dyspnea, syncope, 

headache, dizziness, GI bleed, back pain, seizure, CVA, palpatations, mental 

health, musculoskeletal)? 


@  -prior


EKG interpreted by me (3pts min.).


@  -


X-rays interpreted by me (1pt min.).


@  -yes


CT interpreted by me (1pt min.).


@  -no


U/S interpreted by me (1pt. min.).


@  -no


What testing was considered but not performed or refused? (CT, X-rays, U/S, lab

s)? Why?


@  -none


What meds were considered but not given or refused? Why?


@  -none


Did you discuss the management of the patient with other professionals 

(professionals i.e. KATHY Edwards, NP, lab, RT, psych nurse, , , 

teacher, , )? Give summary


@  -no


Was smoking cessation discussed for >3mins.?


@  -no


Was critical care preformed (if so, how long)?


@  -no


Were there social determinants of health that impacted care today? How? 

(Homelessness, low income, unemployed, alcoholism, drug addiction, 

transportation, low edu. Level, literacy, decrease access to med. care, penitentiary, 

rehab)?


@  -none


Was there de-escalation of care discussed even if they declined (Discuss DNR or 

withdrawal of care, Hospice)? DNR status


@  -no


What co-morbidities impacted this encounter? (DM, HTN, Smoking, COPD, CAD, 

Cancer, CVA, ARF, Chemo, Hep., AIDS, mental health diagnosis, sleep apnea, 

morbid obesity)?


@  -none


Was patient admitted / discharged? Hospital course, mention meds given and 

route, prescriptions, significant lab abnormalities, going to OR and other 

pertinent info.


@  - 42 female to the emergency department for evaluation.  Patient Dese with 

upper respiratory infection sore throat hospital voice.  Patient symptoms 

improved here with steroids, x-rays are negative patient can be discharged home


Discharge


Undiagnosed new problem with uncertain prognosis?


@  -no


Drug Therapy requiring intensive monitoring for toxicity (Heparin, Nitro, 

Insulin, Cardizem)?


@  -no


Were any procedures done?


@  -no


Diagnosis/symptom?


@  -


AcSore throat pharyngitisnoute, or Chronic, or Acute on Chronic?


@  -Acute


Uncomplicated (without systemic symptoms) or Complicated (systemic symptoms)?


@  -Complicated


Side effects of treatment?


@  -no


Exacerbation, Progression, or Severe Exacerbation?


@  -exacerbation


Poses a threat to life or bodily function? How? (Chest pain, USA, MI, pneumonia,

 PE, COPD, DKA, ARF, appy, cholecystitis, CVA, Diverticulitis, Homicidal, 

Suicidal, threat to staff... and all critical care pts)


@  -no





Medical Decision Making





- Medical Decision Making





42 female to the emergency department for evaluation.  Patient Dese with upper 

respiratory infection sore throat hospital voice.  Patient symptoms improved 

here with steroids, x-rays are negative patient can be discharged home





- Radiology Data


Radiology results: report reviewed (X-ray soft tissue neck and chest are 

negative for acute disease), image reviewed





Disposition


Clinical Impression: 


 Pharyngitis, Upper respiratory tract infection, Laryngitis





Disposition: HOME SELF-CARE


Condition: Good


Instructions (If sedation given, give patient instructions):  Pharyngitis (ED), 

Croup (ED)


Prescriptions: 


predniSONE 50 mg PO DAILY #5 tab


Is patient prescribed a controlled substance at d/c from ED?: No


Referrals: 


Luna Webber DO [Primary Care Provider] - 1-2 days


Time of Disposition: 19:00

## 2024-11-13 ENCOUNTER — HOSPITAL ENCOUNTER (EMERGENCY)
Dept: HOSPITAL 47 - EC | Age: 44
Discharge: HOME | End: 2024-11-13
Payer: COMMERCIAL

## 2024-11-13 VITALS
TEMPERATURE: 97.9 F | HEART RATE: 98 BPM | DIASTOLIC BLOOD PRESSURE: 93 MMHG | SYSTOLIC BLOOD PRESSURE: 128 MMHG | RESPIRATION RATE: 16 BRPM

## 2024-11-13 DIAGNOSIS — N13.2: Primary | ICD-10-CM

## 2024-11-13 DIAGNOSIS — Z91.018: ICD-10-CM

## 2024-11-13 DIAGNOSIS — Z88.1: ICD-10-CM

## 2024-11-13 DIAGNOSIS — Z88.2: ICD-10-CM

## 2024-11-13 LAB
ALBUMIN SERPL-MCNC: 4.3 G/DL (ref 3.5–5)
ALP SERPL-CCNC: 61 U/L (ref 38–126)
ALT SERPL-CCNC: 37 U/L (ref 4–34)
ANION GAP SERPL CALC-SCNC: 9 MMOL/L
AST SERPL-CCNC: 37 U/L (ref 14–36)
BASOPHILS # BLD AUTO: 0.1 K/UL (ref 0–0.2)
BASOPHILS NFR BLD AUTO: 1 %
BUN SERPL-SCNC: 11 MG/DL (ref 7–17)
CALCIUM SPEC-MCNC: 9.2 MG/DL (ref 8.4–10.2)
CHLORIDE SERPL-SCNC: 106 MMOL/L (ref 98–107)
CO2 SERPL-SCNC: 22 MMOL/L (ref 22–30)
EOSINOPHIL # BLD AUTO: 0.2 K/UL (ref 0–0.7)
EOSINOPHIL NFR BLD AUTO: 3 %
ERYTHROCYTE [DISTWIDTH] IN BLOOD BY AUTOMATED COUNT: 4.88 M/UL (ref 3.8–5.4)
ERYTHROCYTE [DISTWIDTH] IN BLOOD: 12.8 % (ref 11.5–15.5)
GLUCOSE SERPL-MCNC: 189 MG/DL (ref 74–99)
GLUCOSE UR QL: (no result)
HCT VFR BLD AUTO: 44 % (ref 34–46)
HGB BLD-MCNC: 14.3 GM/DL (ref 11.4–16)
LIPASE SERPL-CCNC: 110 U/L (ref 23–300)
LYMPHOCYTES # SPEC AUTO: 1.8 K/UL (ref 1–4.8)
LYMPHOCYTES NFR SPEC AUTO: 18 %
MCH RBC QN AUTO: 29.3 PG (ref 25–35)
MCHC RBC AUTO-ENTMCNC: 32.6 G/DL (ref 31–37)
MCV RBC AUTO: 90.1 FL (ref 80–100)
MONOCYTES # BLD AUTO: 0.6 K/UL (ref 0–1)
MONOCYTES NFR BLD AUTO: 6 %
NEUTROPHILS # BLD AUTO: 6.9 K/UL (ref 1.3–7.7)
NEUTROPHILS NFR BLD AUTO: 71 %
PH UR: 5 [PH] (ref 5–8)
PLATELET # BLD AUTO: 190 K/UL (ref 150–450)
POTASSIUM SERPL-SCNC: 4.4 MMOL/L (ref 3.5–5.1)
PROT SERPL-MCNC: 7 G/DL (ref 6.3–8.2)
RBC UR QL: >182 /HPF (ref 0–5)
SODIUM SERPL-SCNC: 137 MMOL/L (ref 137–145)
SP GR UR: 1.02 (ref 1–1.03)
SQUAMOUS UR QL AUTO: 5 /HPF (ref 0–4)
UROBILINOGEN UR QL STRIP: <2 MG/DL (ref ?–2)
WBC # BLD AUTO: 9.8 K/UL (ref 3.8–10.6)
WBC #/AREA URNS HPF: 4 /HPF (ref 0–5)

## 2024-11-13 PROCEDURE — 96374 THER/PROPH/DIAG INJ IV PUSH: CPT

## 2024-11-13 PROCEDURE — 93005 ELECTROCARDIOGRAM TRACING: CPT

## 2024-11-13 PROCEDURE — 99285 EMERGENCY DEPT VISIT HI MDM: CPT

## 2024-11-13 PROCEDURE — 36415 COLL VENOUS BLD VENIPUNCTURE: CPT

## 2024-11-13 PROCEDURE — 74176 CT ABD & PELVIS W/O CONTRAST: CPT

## 2024-11-13 PROCEDURE — 81025 URINE PREGNANCY TEST: CPT

## 2024-11-13 PROCEDURE — 96361 HYDRATE IV INFUSION ADD-ON: CPT

## 2024-11-13 PROCEDURE — 85025 COMPLETE CBC W/AUTO DIFF WBC: CPT

## 2024-11-13 PROCEDURE — 96375 TX/PRO/DX INJ NEW DRUG ADDON: CPT

## 2024-11-13 PROCEDURE — 80053 COMPREHEN METABOLIC PANEL: CPT

## 2024-11-13 PROCEDURE — 81001 URINALYSIS AUTO W/SCOPE: CPT

## 2024-11-13 PROCEDURE — 83690 ASSAY OF LIPASE: CPT

## 2024-11-13 RX ADMIN — ONDANSETRON STA MG: 2 INJECTION INTRAMUSCULAR; INTRAVENOUS at 16:59

## 2024-11-13 RX ADMIN — KETOROLAC TROMETHAMINE STA MG: 15 INJECTION, SOLUTION INTRAMUSCULAR; INTRAVENOUS at 16:58

## 2024-11-13 RX ADMIN — MORPHINE SULFATE STA MG: 4 INJECTION, SOLUTION INTRAMUSCULAR; INTRAVENOUS at 16:54

## 2024-11-13 RX ADMIN — CEFAZOLIN STA MLS/HR: 330 INJECTION, POWDER, FOR SOLUTION INTRAMUSCULAR; INTRAVENOUS at 16:52

## 2025-07-30 ENCOUNTER — HOSPITAL ENCOUNTER (EMERGENCY)
Dept: HOSPITAL 47 - EC | Age: 45
Discharge: HOME | End: 2025-07-30
Payer: COMMERCIAL

## 2025-07-30 VITALS — HEART RATE: 95 BPM | TEMPERATURE: 98.3 F | DIASTOLIC BLOOD PRESSURE: 81 MMHG | SYSTOLIC BLOOD PRESSURE: 131 MMHG

## 2025-07-30 VITALS — RESPIRATION RATE: 16 BRPM

## 2025-07-30 DIAGNOSIS — N13.2: Primary | ICD-10-CM

## 2025-07-30 DIAGNOSIS — Z88.1: ICD-10-CM

## 2025-07-30 DIAGNOSIS — Z91.018: ICD-10-CM

## 2025-07-30 DIAGNOSIS — Z88.8: ICD-10-CM

## 2025-07-30 DIAGNOSIS — Z88.2: ICD-10-CM

## 2025-07-30 LAB
ALBUMIN SERPL-MCNC: 4.5 G/DL (ref 3.5–5)
ALP SERPL-CCNC: 82 U/L (ref 38–126)
ALT SERPL-CCNC: 22 U/L (ref 4–34)
AMORPH SED URNS QL MICRO: (no result) /HPF
AMYLASE SERPL-CCNC: 56 U/L (ref 30–110)
ANION GAP SERPL CALC-SCNC: 9 MMOL/L
ANISOCYTOSIS BLD QL SMEAR: (no result)
APPEARANCE UR: (no result)
AST SERPL-CCNC: 22 U/L (ref 14–36)
BACTERIA UR QL AUTO: (no result) /HPF
BASO STIPL BLD QL SMEAR: (no result)
BASOPHILS # BLD AUTO: 0.12 10*3/UL (ref 0–0.1)
BASOPHILS NFR BLD AUTO: 0.9 %
BILIRUB BLD-MCNC: 0.4 MG/DL (ref 0.2–1.3)
BILIRUB UR QL CFM: (no result)
BILIRUB UR QL STRIP.AUTO: NEGATIVE
BROAD CASTS [PRESENCE] IN URINE BY COMPUTER ASSISTED METHOD: (no result) /LPF
BUN SERPL-SCNC: 18 MG/DL (ref 7–17)
BURR CELLS BLD QL SMEAR: (no result)
CA CARBONATE CRY #/AREA URNS HPF: (no result) /HPF
CA PHOS CRY UR QL COMP ASSIST: (no result) /HPF
CALCIUM SPEC-MCNC: 9.5 MG/DL (ref 8.4–10.2)
CAOX CRY UR QL COMP ASSIST: (no result) /HPF
CASTS URNS QL MICRO: (no result) /LPF
CHLORIDE SERPL-SCNC: 102 MMOL/L (ref 98–107)
CO2 SERPL-SCNC: 25 MMOL/L (ref 22–30)
COLOR UR: YELLOW
CREATININE: 0.84 MG/DL (ref 0.52–1.04)
CRYSTALS UR QL: (no result) /HPF
CYSTINE CRY #/AREA URNS HPF: (no result) /HPF
DACRYOCYTES BLD QL SMEAR: (no result)
DOHLE BOD BLD QL SMEAR: (no result)
EOSINOPHIL # BLD AUTO: 0.3 10*3/UL (ref 0.04–0.35)
EOSINOPHIL NFR BLD AUTO: 2.2 %
EPITHELIAL CASTS [PRESENCE] IN URINE BY COMPUTER ASSISTED METHOD: (no result) /LPF
ERYTHROCYTE CLUMPS [PRESENCE] IN URINE BY COMPUTER ASSISTED METHOD: (no result) /HPF
ERYTHROCYTE [DISTWIDTH] IN BLOOD BY AUTOMATED COUNT: 5.02 10*6/UL (ref 4.1–5.2)
ERYTHROCYTE [DISTWIDTH] IN BLOOD: 13.2 % (ref 11.5–14.5)
FATTY CASTS #/AREA UR COMP ASSIST: (no result) /LPF
GLUCOSE SERPL-MCNC: 157 MG/DL (ref 74–99)
GLUCOSE UR QL: (no result)
GRAN CASTS UR QL COMP ASSIST: (no result) /LPF
HCT VFR BLD AUTO: 44.5 % (ref 37.2–46.3)
HGB BLD-MCNC: 14.9 G/DL (ref 12–15)
HOWELL-JOLLY BOD BLD QL SMEAR: (no result)
HYALINE CASTS UR QL AUTO: (no result) /LPF (ref 0–2)
HYPOCHROMIA BLD QL SMEAR: (no result)
IMM GRANULOCYTES # BLD: 0.06 10*3/UL (ref 0–0.04)
KETONES UR QL STRIP.AUTO: (no result)
LEUCINE CRYSTALS [PRESENCE] IN URINE BY COMPUTER ASSISTED METHOD: (no result) /HPF
LEUKOCYTE ESTERASE UR QL STRIP.AUTO: NEGATIVE
LG PLATELETS BLD QL SMEAR: (no result)
LIPASE SERPL-CCNC: 118 U/L (ref 23–300)
LYMPHOCYTES # SPEC AUTO: 2.29 10*3/UL (ref 0.9–5)
LYMPHOCYTES NFR SPEC AUTO: 16.9 %
Lab: (no result)
MCH RBC QN AUTO: 29.7 PG (ref 27–32)
MCHC RBC AUTO-ENTMCNC: 33.5 G/DL (ref 32–37)
MCV RBC AUTO: 88.6 FL (ref 80–97)
MIXED CELL CASTS UR QL COMP ASSIST: (no result) /LPF
MONOCYTES # BLD AUTO: 0.95 10*3/UL (ref 0.2–1)
MONOCYTES NFR BLD AUTO: 7 %
MUCOUS THREADS UR QL AUTO: (no result) /HPF
NEUTROPHILS # BLD AUTO: 9.84 10*3/UL (ref 1.8–7.7)
NEUTROPHILS NFR BLD AUTO: 72.6 %
NEUTS HYPERSEG # BLD: (no result) 10*3/UL
NITRITE UR QL STRIP.AUTO: NEGATIVE
NRBC BLD AUTO-RTO: (no result) %
OVAL FAT BODIES #/AREA UR COMP ASSIST: (no result) /HPF
OVALOCYTES BLD QL SMEAR: (no result)
PELGER HUET CELLS BLD QL SMEAR: (no result)
PH UR: 5 [PH] (ref 5–8)
PLATELET # BLD AUTO: 216 10*3/UL (ref 140–440)
PLATELETS.RETICULATED NFR BLD AUTO: (no result) %
PMV BLD AUTO: 12.1 FL (ref 9.5–12.2)
POIKILOCYTOSIS BLD QL SMEAR: (no result)
POIKILOCYTOSIS BLD QL SMEAR: (no result)
POLYCHROMASIA BLD QL SMEAR: (no result)
POTASSIUM SERPL-SCNC: 4.4 MMOL/L (ref 3.5–5.1)
PROT SERPL-MCNC: 7.5 G/DL (ref 6.3–8.2)
PROT UR QL SSA: (no result)
PROT UR QL: (no result)
RBC CASTS UR QL COMP ASSIST: (no result) /LPF
RBC MORPH BLD: (no result)
RBC UR QL: (no result)
RBC UR QL: 144 /HPF (ref 0–5)
RENAL EPI CELLS UR QL COMP ASSIST: (no result) /HPF
ROULEAUX BLD QL SMEAR: (no result)
SCHISTOCYTES BLD QL SMEAR: (no result)
SICKLE CELLS BLD QL SMEAR: (no result)
SODIUM SERPL-SCNC: 136 MMOL/L (ref 137–145)
SP GR UR: 1.03 (ref 1–1.03)
SPERM # UR AUTO: (no result) /HPF
SPHEROCYTES BLD QL SMEAR: (no result)
SQUAMOUS UR QL AUTO: 15 /HPF (ref 0–4)
STOMATOCYTES BLD QL SMEAR: (no result)
TARGETS BLD QL SMEAR: (no result)
TOXIC GRANULES BLD QL SMEAR: (no result)
TRANS CELLS UR QL COMP ASSIST: (no result) /HPF (ref 0–1)
TRI-PHOS CRY UR QL COMP ASSIST: (no result) /HPF
TRICHOMONAS UR QL COMP ASSIST: (no result) /HPF
TYROSINE CRYSTALS [PRESENCE] IN URINE BY COMPUTER ASSISTED METHOD: (no result) /HPF
URATE CRY UR QL COMP ASSIST: (no result) /HPF
UROBILINOGEN UR QL STRIP: 2 MG/DL (ref ?–2)
VARIANT LYMPHS BLD QL SMEAR: (no result)
WAXY CASTS #/AREA UR COMP ASSIST: (no result) /LPF
WBC # BLD AUTO: 13.56 10*3/UL (ref 4.5–10)
WBC #/AREA URNS HPF: 17 /HPF (ref 0–5)
WBC CASTS #/AREA URNS LPF: (no result) /LPF
WBC CLUMPS UR QL AUTO: (no result) /HPF
WBC NRBC COR # BLD: (no result) K/UL
WBC TOXIC VACUOLES BLD QL SMEAR: (no result)
YEAST BUDDING UR QL COMP ASSIST: (no result) /HPF
YEAST HYPHAE UR QL COMP ASSIST: (no result) /HPF

## 2025-07-30 PROCEDURE — 96365 THER/PROPH/DIAG IV INF INIT: CPT

## 2025-07-30 PROCEDURE — 99284 EMERGENCY DEPT VISIT MOD MDM: CPT

## 2025-07-30 PROCEDURE — 81001 URINALYSIS AUTO W/SCOPE: CPT

## 2025-07-30 PROCEDURE — 36415 COLL VENOUS BLD VENIPUNCTURE: CPT

## 2025-07-30 PROCEDURE — 80053 COMPREHEN METABOLIC PANEL: CPT

## 2025-07-30 PROCEDURE — 96361 HYDRATE IV INFUSION ADD-ON: CPT

## 2025-07-30 PROCEDURE — 83690 ASSAY OF LIPASE: CPT

## 2025-07-30 PROCEDURE — 85025 COMPLETE CBC W/AUTO DIFF WBC: CPT

## 2025-07-30 PROCEDURE — 82150 ASSAY OF AMYLASE: CPT

## 2025-07-30 PROCEDURE — 74176 CT ABD & PELVIS W/O CONTRAST: CPT

## 2025-07-30 PROCEDURE — 96376 TX/PRO/DX INJ SAME DRUG ADON: CPT

## 2025-07-30 PROCEDURE — 83605 ASSAY OF LACTIC ACID: CPT

## 2025-07-30 PROCEDURE — 96375 TX/PRO/DX INJ NEW DRUG ADDON: CPT

## 2025-07-30 RX ADMIN — HYDROMORPHONE HYDROCHLORIDE STA MG: 1 INJECTION, SOLUTION INTRAMUSCULAR; INTRAVENOUS; SUBCUTANEOUS at 19:57

## 2025-07-30 RX ADMIN — CEFTRIAXONE STA MLS/HR: 2 INJECTION, POWDER, FOR SOLUTION INTRAMUSCULAR; INTRAVENOUS at 20:57

## 2025-07-30 RX ADMIN — ONDANSETRON STA MG: 2 INJECTION INTRAMUSCULAR; INTRAVENOUS at 20:01

## 2025-07-30 RX ADMIN — HYDROMORPHONE HYDROCHLORIDE STA MG: 1 INJECTION, SOLUTION INTRAMUSCULAR; INTRAVENOUS; SUBCUTANEOUS at 22:31

## 2025-07-30 RX ADMIN — CEFAZOLIN ONE MLS/HR: 330 INJECTION, POWDER, FOR SOLUTION INTRAMUSCULAR; INTRAVENOUS at 19:54

## 2025-07-30 RX ADMIN — KETOROLAC TROMETHAMINE STA MG: 15 INJECTION, SOLUTION INTRAMUSCULAR; INTRAVENOUS at 19:54
